# Patient Record
Sex: FEMALE | Race: WHITE | NOT HISPANIC OR LATINO | Employment: FULL TIME | URBAN - NONMETROPOLITAN AREA
[De-identification: names, ages, dates, MRNs, and addresses within clinical notes are randomized per-mention and may not be internally consistent; named-entity substitution may affect disease eponyms.]

---

## 2017-01-20 ENCOUNTER — TRANSCRIBE ORDERS (OUTPATIENT)
Dept: ADMINISTRATIVE | Facility: HOSPITAL | Age: 57
End: 2017-01-20

## 2017-01-20 ENCOUNTER — APPOINTMENT (OUTPATIENT)
Dept: LAB | Facility: HOSPITAL | Age: 57
End: 2017-01-20
Attending: FAMILY MEDICINE

## 2017-01-20 DIAGNOSIS — R53.83 LETHARGY: ICD-10-CM

## 2017-01-20 DIAGNOSIS — G25.81 RESTLESS LEGS: ICD-10-CM

## 2017-01-20 DIAGNOSIS — E78.5 HYPERLIPIDEMIA, UNSPECIFIED HYPERLIPIDEMIA TYPE: Primary | ICD-10-CM

## 2017-01-20 DIAGNOSIS — M54.9 BACKACHE, UNSPECIFIED: ICD-10-CM

## 2017-01-20 LAB
ALBUMIN SERPL-MCNC: 4.3 G/DL (ref 3.5–5)
ALBUMIN/GLOB SERPL: 1.4 G/DL (ref 1.1–2.5)
ALP SERPL-CCNC: 98 U/L (ref 24–120)
ALT SERPL W P-5'-P-CCNC: 39 U/L (ref 0–54)
ANION GAP SERPL CALCULATED.3IONS-SCNC: 12 MMOL/L (ref 4–13)
ARTICHOKE IGE QN: 195 MG/DL (ref 0–99)
AST SERPL-CCNC: 28 U/L (ref 7–45)
BILIRUB SERPL-MCNC: 0.5 MG/DL (ref 0.1–1)
BUN BLD-MCNC: 11 MG/DL (ref 5–21)
BUN/CREAT SERPL: 12.4 (ref 7–25)
CALCIUM SPEC-SCNC: 9.1 MG/DL (ref 8.4–10.4)
CHLORIDE SERPL-SCNC: 107 MMOL/L (ref 98–110)
CHOLEST SERPL-MCNC: 290 MG/DL (ref 130–200)
CO2 SERPL-SCNC: 24 MMOL/L (ref 24–31)
CREAT BLD-MCNC: 0.89 MG/DL (ref 0.5–1.4)
DEPRECATED RDW RBC AUTO: 46.3 FL (ref 40–54)
ERYTHROCYTE [DISTWIDTH] IN BLOOD BY AUTOMATED COUNT: 12.9 % (ref 12–15)
GFR SERPL CREATININE-BSD FRML MDRD: 66 ML/MIN/1.73
GLOBULIN UR ELPH-MCNC: 3.1 GM/DL
GLUCOSE BLD-MCNC: 96 MG/DL (ref 70–100)
HCT VFR BLD AUTO: 44.1 % (ref 37–47)
HDLC SERPL-MCNC: 37 MG/DL
HGB BLD-MCNC: 15.2 G/DL (ref 12–16)
LDLC/HDLC SERPL: 4.85 {RATIO}
MCH RBC QN AUTO: 33.8 PG (ref 28–32)
MCHC RBC AUTO-ENTMCNC: 34.5 G/DL (ref 33–36)
MCV RBC AUTO: 98 FL (ref 82–98)
PLATELET # BLD AUTO: 285 10*3/MM3 (ref 130–400)
PMV BLD AUTO: 9.6 FL (ref 6–12)
POTASSIUM BLD-SCNC: 4.3 MMOL/L (ref 3.5–5.3)
PROT SERPL-MCNC: 7.4 G/DL (ref 6.3–8.7)
RBC # BLD AUTO: 4.5 10*6/MM3 (ref 4.2–5.4)
SODIUM BLD-SCNC: 143 MMOL/L (ref 135–145)
T3FREE SERPL-MCNC: 3.51 PG/ML (ref 2.77–5.27)
T4 FREE SERPL-MCNC: 0.88 NG/DL (ref 0.78–2.19)
TRIGL SERPL-MCNC: 368 MG/DL (ref 0–149)
TSH SERPL DL<=0.05 MIU/L-ACNC: 0.86 MIU/ML (ref 0.47–4.68)
URATE SERPL-MCNC: 6.7 MG/DL (ref 2.7–7.5)
WBC NRBC COR # BLD: 7.22 10*3/MM3 (ref 4.8–10.8)

## 2017-01-20 PROCEDURE — 84443 ASSAY THYROID STIM HORMONE: CPT | Performed by: FAMILY MEDICINE

## 2017-01-20 PROCEDURE — 85027 COMPLETE CBC AUTOMATED: CPT | Performed by: FAMILY MEDICINE

## 2017-01-20 PROCEDURE — 84481 FREE ASSAY (FT-3): CPT | Performed by: FAMILY MEDICINE

## 2017-01-20 PROCEDURE — 80061 LIPID PANEL: CPT | Performed by: FAMILY MEDICINE

## 2017-01-20 PROCEDURE — 84550 ASSAY OF BLOOD/URIC ACID: CPT | Performed by: FAMILY MEDICINE

## 2017-01-20 PROCEDURE — 80053 COMPREHEN METABOLIC PANEL: CPT | Performed by: FAMILY MEDICINE

## 2017-01-20 PROCEDURE — 84439 ASSAY OF FREE THYROXINE: CPT | Performed by: FAMILY MEDICINE

## 2017-01-20 PROCEDURE — 36415 COLL VENOUS BLD VENIPUNCTURE: CPT | Performed by: FAMILY MEDICINE

## 2017-01-24 ENCOUNTER — HOSPITAL ENCOUNTER (OUTPATIENT)
Dept: GENERAL RADIOLOGY | Facility: HOSPITAL | Age: 57
Discharge: HOME | End: 2017-01-24
Attending: FAMILY MEDICINE | Admitting: FAMILY MEDICINE

## 2017-01-24 ENCOUNTER — TRANSCRIBE ORDERS (OUTPATIENT)
Dept: ADMINISTRATIVE | Facility: HOSPITAL | Age: 57
End: 2017-01-24

## 2017-01-24 DIAGNOSIS — M79.672 PAIN IN LEFT FOOT: ICD-10-CM

## 2017-01-24 DIAGNOSIS — M79.672 PAIN IN LEFT FOOT: Primary | ICD-10-CM

## 2017-01-24 PROCEDURE — 73630 X-RAY EXAM OF FOOT: CPT

## 2017-01-27 ENCOUNTER — TRANSCRIBE ORDERS (OUTPATIENT)
Dept: ADMINISTRATIVE | Facility: HOSPITAL | Age: 57
End: 2017-01-27

## 2017-01-27 DIAGNOSIS — Z12.31 ENCOUNTER FOR SCREENING MAMMOGRAM FOR MALIGNANT NEOPLASM OF BREAST: Primary | ICD-10-CM

## 2017-02-03 ENCOUNTER — APPOINTMENT (OUTPATIENT)
Dept: MAMMOGRAPHY | Facility: HOSPITAL | Age: 57
End: 2017-02-03
Attending: FAMILY MEDICINE

## 2017-02-28 PROCEDURE — 87086 URINE CULTURE/COLONY COUNT: CPT | Performed by: FAMILY MEDICINE

## 2017-06-27 ENCOUNTER — TELEPHONE (OUTPATIENT)
Dept: GASTROENTEROLOGY | Facility: CLINIC | Age: 57
End: 2017-06-27

## 2017-06-27 NOTE — TELEPHONE ENCOUNTER
ATTEMPTED TO CALL PT REGARDING CANCELED APPT ON 4/5/17.  CALLED NUMBER IN CHART ON 4/12/17, 5/12/17 AND 6/27/17, IT WAS DISCONNECTED ON ALL 3 ATTEMPTS.

## 2019-10-24 ENCOUNTER — APPOINTMENT (OUTPATIENT)
Dept: LAB | Facility: HOSPITAL | Age: 59
End: 2019-10-24

## 2019-10-24 ENCOUNTER — TRANSCRIBE ORDERS (OUTPATIENT)
Dept: ADMINISTRATIVE | Facility: HOSPITAL | Age: 59
End: 2019-10-24

## 2019-10-24 DIAGNOSIS — N63.11 UNSPECIFIED LUMP IN THE RIGHT BREAST, UPPER OUTER QUADRANT: ICD-10-CM

## 2019-10-24 DIAGNOSIS — Z00.01 ENCOUNTER FOR GENERAL ADULT MEDICAL EXAMINATION WITH ABNORMAL FINDINGS: Primary | ICD-10-CM

## 2019-10-24 DIAGNOSIS — E78.5 HYPERLIPIDEMIA, UNSPECIFIED HYPERLIPIDEMIA TYPE: ICD-10-CM

## 2019-10-24 LAB
ALBUMIN SERPL-MCNC: 4.3 G/DL (ref 3.5–5.2)
ALBUMIN/GLOB SERPL: 1.5 G/DL
ALP SERPL-CCNC: 79 U/L (ref 39–117)
ALT SERPL W P-5'-P-CCNC: 21 U/L (ref 1–33)
ANION GAP SERPL CALCULATED.3IONS-SCNC: 9.7 MMOL/L (ref 5–15)
AST SERPL-CCNC: 17 U/L (ref 1–32)
BILIRUB SERPL-MCNC: 0.3 MG/DL (ref 0.2–1.2)
BUN BLD-MCNC: 12 MG/DL (ref 6–20)
BUN/CREAT SERPL: 14.6 (ref 7–25)
CALCIUM SPEC-SCNC: 9.3 MG/DL (ref 8.6–10.5)
CHLORIDE SERPL-SCNC: 104 MMOL/L (ref 98–107)
CHOLEST SERPL-MCNC: 258 MG/DL (ref 0–200)
CO2 SERPL-SCNC: 28.3 MMOL/L (ref 22–29)
CREAT BLD-MCNC: 0.82 MG/DL (ref 0.57–1)
DEPRECATED RDW RBC AUTO: 45.2 FL (ref 37–54)
ERYTHROCYTE [DISTWIDTH] IN BLOOD BY AUTOMATED COUNT: 12.1 % (ref 12.3–15.4)
GFR SERPL CREATININE-BSD FRML MDRD: 72 ML/MIN/1.73
GLOBULIN UR ELPH-MCNC: 2.9 GM/DL
GLUCOSE BLD-MCNC: 101 MG/DL (ref 65–99)
HCT VFR BLD AUTO: 44.7 % (ref 34–46.6)
HDLC SERPL-MCNC: 40 MG/DL (ref 40–60)
HGB BLD-MCNC: 15.4 G/DL (ref 12–15.9)
LDLC SERPL CALC-MCNC: 173 MG/DL (ref 0–100)
LDLC/HDLC SERPL: 4.33 {RATIO}
MCH RBC QN AUTO: 34.7 PG (ref 26.6–33)
MCHC RBC AUTO-ENTMCNC: 34.5 G/DL (ref 31.5–35.7)
MCV RBC AUTO: 100.7 FL (ref 79–97)
PLATELET # BLD AUTO: 331 10*3/MM3 (ref 140–450)
PMV BLD AUTO: 9.3 FL (ref 6–12)
POTASSIUM BLD-SCNC: 4.5 MMOL/L (ref 3.5–5.2)
PROT SERPL-MCNC: 7.2 G/DL (ref 6–8.5)
RBC # BLD AUTO: 4.44 10*6/MM3 (ref 3.77–5.28)
SODIUM BLD-SCNC: 142 MMOL/L (ref 136–145)
T3FREE SERPL-MCNC: 3.16 PG/ML (ref 2–4.4)
T4 FREE SERPL-MCNC: 0.98 NG/DL (ref 0.93–1.7)
TRIGL SERPL-MCNC: 224 MG/DL (ref 0–150)
TSH SERPL DL<=0.05 MIU/L-ACNC: 1.31 UIU/ML (ref 0.27–4.2)
URATE SERPL-MCNC: 5.5 MG/DL (ref 2.4–5.7)
VLDLC SERPL-MCNC: 44.8 MG/DL (ref 5–40)
WBC NRBC COR # BLD: 7.41 10*3/MM3 (ref 3.4–10.8)

## 2019-10-24 PROCEDURE — 85027 COMPLETE CBC AUTOMATED: CPT | Performed by: PHYSICIAN ASSISTANT

## 2019-10-24 PROCEDURE — 84550 ASSAY OF BLOOD/URIC ACID: CPT | Performed by: PHYSICIAN ASSISTANT

## 2019-10-24 PROCEDURE — 84481 FREE ASSAY (FT-3): CPT | Performed by: PHYSICIAN ASSISTANT

## 2019-10-24 PROCEDURE — 84443 ASSAY THYROID STIM HORMONE: CPT | Performed by: PHYSICIAN ASSISTANT

## 2019-10-24 PROCEDURE — 80053 COMPREHEN METABOLIC PANEL: CPT | Performed by: PHYSICIAN ASSISTANT

## 2019-10-24 PROCEDURE — 84439 ASSAY OF FREE THYROXINE: CPT | Performed by: PHYSICIAN ASSISTANT

## 2019-10-24 PROCEDURE — 80061 LIPID PANEL: CPT | Performed by: PHYSICIAN ASSISTANT

## 2019-10-24 PROCEDURE — 36415 COLL VENOUS BLD VENIPUNCTURE: CPT | Performed by: PHYSICIAN ASSISTANT

## 2019-10-28 ENCOUNTER — OFFICE VISIT (OUTPATIENT)
Dept: GASTROENTEROLOGY | Facility: CLINIC | Age: 59
End: 2019-10-28

## 2019-10-28 VITALS
HEIGHT: 62 IN | DIASTOLIC BLOOD PRESSURE: 76 MMHG | SYSTOLIC BLOOD PRESSURE: 134 MMHG | TEMPERATURE: 97.5 F | BODY MASS INDEX: 25.4 KG/M2 | OXYGEN SATURATION: 98 % | HEART RATE: 80 BPM | WEIGHT: 138 LBS

## 2019-10-28 DIAGNOSIS — Z86.010 HX OF COLONIC POLYPS: Primary | ICD-10-CM

## 2019-10-28 PROBLEM — Z86.0100 HX OF COLONIC POLYPS: Status: ACTIVE | Noted: 2019-10-28

## 2019-10-28 PROCEDURE — S0285 CNSLT BEFORE SCREEN COLONOSC: HCPCS | Performed by: NURSE PRACTITIONER

## 2019-10-28 RX ORDER — IBUPROFEN 200 MG
200 TABLET ORAL AS NEEDED
COMMUNITY

## 2019-10-28 RX ORDER — SODIUM, POTASSIUM,MAG SULFATES 17.5-3.13G
1 SOLUTION, RECONSTITUTED, ORAL ORAL EVERY 12 HOURS
Qty: 2 BOTTLE | Refills: 0 | Status: ON HOLD | OUTPATIENT
Start: 2019-10-28 | End: 2019-11-06

## 2019-10-28 NOTE — PROGRESS NOTES
Chief Complaint   Patient presents with   • Colon Cancer Screening     Pt presents today for colon recall-last colon was 11/19/2012; Personal history of colon polyps     Subjective   HPI  Rachel Aranda is a 59 y.o. female who presents as a referral for preventative maintenance. She has no complaints of nausea or vomiting. No change in bowels. No wt loss. No BRBPR. No melena. There is no family hx for colon cancer. No abdominal pain. There was no Cologuard screening this year. The patients last colonoscopy was 11/19/12 with adenomatous polyps.  Past Medical History:   Diagnosis Date   • Back pain    • COPD, mild (CMS/HCC)    • History of colon polyps    • Hyperglycemia    • Hyperlipidemia    • PREM (obstructive sleep apnea)      Past Surgical History:   Procedure Laterality Date   • BREAST BIOPSY Right    • CARDIAC DEFIBRILLATOR PLACEMENT     • COLONOSCOPY  11/19/2012    Five 6mm-1.2cm tubular adenomatous polyps in transverse colon; Four less than 5mm hyperplastic polyps in the rectum; Repeat 4 years   • PARTIAL HYSTERECTOMY     • PATENT FORAMEN OVALE CLOSURE         Current Outpatient Medications:   •  ibuprofen (ADVIL,MOTRIN) 200 MG tablet, Take 200 mg by mouth As Needed for Mild Pain ., Disp: , Rfl:   •  sodium-potassium-magnesium sulfates (SUPREP BOWEL PREP KIT) 17.5-3.13-1.6 GM/177ML solution oral solution, Take 1 bottle by mouth Every 12 (Twelve) Hours. Split dose prep as directed by office instructions provided.  2 bottles = one kit., Disp: 2 bottle, Rfl: 0  No Known Allergies  Social History     Socioeconomic History   • Marital status: Single     Spouse name: Not on file   • Number of children: Not on file   • Years of education: Not on file   • Highest education level: Not on file   Tobacco Use   • Smoking status: Current Every Day Smoker     Packs/day: 1.00   • Smokeless tobacco: Never Used   Substance and Sexual Activity   • Alcohol use: Yes     Frequency: Never     Comment: Occasionally     Family History  "  Problem Relation Age of Onset   • Colon cancer Neg Hx    • Colon polyps Neg Hx    • Esophageal cancer Neg Hx    • Liver cancer Neg Hx    • Liver disease Neg Hx    • Rectal cancer Neg Hx    • Stomach cancer Neg Hx        REVIEW OF SYSTEMS  General: well appearing, no fever chills or sweats, no unexplained wt loss  HEENT: no acute visual or hearing disturbances  Cardiovascular: No chest pain or palpitations  Pulmonary: No shortness of breath, coughing, wheezing or hemoptysis  : No burning, urgency, hematuria, or dysuria  Musculoskeletal: No joint pain or stiffness  Peripheral: no edema  Skin: No lesions or rashes  Neuro: No dizziness, headaches, stroke, syncope  Endocrine: No hot or cold intolerances  Hematological: No blood dyscrasias    Objective   Vitals:    10/28/19 1323   BP: 134/76   BP Location: Left arm   Patient Position: Sitting   Cuff Size: Adult   Pulse: 80   Temp: 97.5 °F (36.4 °C)   TempSrc: Tympanic   SpO2: 98%   Weight: 62.6 kg (138 lb)   Height: 157.5 cm (62\")     Body mass index is 25.24 kg/m².    PHYSICAL EXAM  General: age appropriate well nourished well appearing, no acute distress  Head: normocephalic and atraumatic  Global assessment-supple  Neck-No JVD noted, no lymphadenopathy  Pulmonary-clear to auscultation bilaterally, normal respiratory effort  Cardiovascular-normal rate and rhythm, normal heart sounds, S1 and S2 noted  Abdomen-soft, non tender, non distended, normal bowel sounds all 4 quadrants, no hepatosplenomegaly noted  Extremities-No clubbing cyanosis or edema  Neuro-Non focal, converses appropriately, awake, alert, oriented    Imaging Results (most recent)     None        Assessment/Plan   Rachel was seen today for colon cancer screening.    Diagnoses and all orders for this visit:    Hx of colonic polyps  -     Case Request; Standing  -     Case Request    Other orders  -     Follow Anesthesia Guidelines / Standing Orders; Future  -     Obtain Informed Consent; Future  -     " Implement Anesthesia Orders Day of Procedure; Standing  -     Obtain Informed Consent; Standing  -     Verify bowel prep was successful; Standing  -     sodium-potassium-magnesium sulfates (SUPREP BOWEL PREP KIT) 17.5-3.13-1.6 GM/177ML solution oral solution; Take 1 bottle by mouth Every 12 (Twelve) Hours. Split dose prep as directed by office instructions provided.  2 bottles = one kit.      COLONOSCOPY WITH ANESTHESIA (N/A)       Body mass index is 25.24 kg/m². Patient's Body mass index is 25.24 kg/m². BMI is above normal parameters. Recommendations include: nutrition counseling.      All risks, benefits, alternatives, and indications of colonoscopy procedure have been discussed with the patient. Risks to include perforation of the colon requiring possible surgery or colostomy, risk of bleeding from biopsies or removal of colon tissue, possibility of missing a colon polyp or cancer, or adverse drug reaction.  Benefits to include the diagnosis and management of disease of the colon and rectum. Alternatives to include barium enema, radiographic evaluation, lab testing or no intervention. Pt verbalizes understanding and agrees.

## 2019-11-06 ENCOUNTER — ANESTHESIA EVENT (OUTPATIENT)
Dept: GASTROENTEROLOGY | Facility: HOSPITAL | Age: 59
End: 2019-11-06

## 2019-11-06 ENCOUNTER — TELEPHONE (OUTPATIENT)
Dept: GASTROENTEROLOGY | Facility: CLINIC | Age: 59
End: 2019-11-06

## 2019-11-06 ENCOUNTER — ANESTHESIA (OUTPATIENT)
Dept: GASTROENTEROLOGY | Facility: HOSPITAL | Age: 59
End: 2019-11-06

## 2019-11-06 ENCOUNTER — HOSPITAL ENCOUNTER (OUTPATIENT)
Facility: HOSPITAL | Age: 59
Setting detail: HOSPITAL OUTPATIENT SURGERY
Discharge: HOME | End: 2019-11-06
Attending: INTERNAL MEDICINE | Admitting: INTERNAL MEDICINE

## 2019-11-06 VITALS
DIASTOLIC BLOOD PRESSURE: 57 MMHG | RESPIRATION RATE: 21 BRPM | TEMPERATURE: 97 F | OXYGEN SATURATION: 96 % | WEIGHT: 140 LBS | BODY MASS INDEX: 25.76 KG/M2 | SYSTOLIC BLOOD PRESSURE: 115 MMHG | HEIGHT: 62 IN | HEART RATE: 96 BPM

## 2019-11-06 PROCEDURE — 25010000002 PROPOFOL 10 MG/ML EMULSION: Performed by: NURSE ANESTHETIST, CERTIFIED REGISTERED

## 2019-11-06 PROCEDURE — G0105 COLORECTAL SCRN; HI RISK IND: HCPCS | Performed by: INTERNAL MEDICINE

## 2019-11-06 RX ORDER — PROPOFOL 10 MG/ML
VIAL (ML) INTRAVENOUS AS NEEDED
Status: DISCONTINUED | OUTPATIENT
Start: 2019-11-06 | End: 2019-11-06 | Stop reason: SURG

## 2019-11-06 RX ORDER — SODIUM CHLORIDE 0.9 % (FLUSH) 0.9 %
10 SYRINGE (ML) INJECTION AS NEEDED
Status: DISCONTINUED | OUTPATIENT
Start: 2019-11-06 | End: 2019-11-06 | Stop reason: HOSPADM

## 2019-11-06 RX ORDER — SODIUM CHLORIDE 9 MG/ML
500 INJECTION, SOLUTION INTRAVENOUS CONTINUOUS PRN
Status: DISCONTINUED | OUTPATIENT
Start: 2019-11-06 | End: 2019-11-06 | Stop reason: HOSPADM

## 2019-11-06 RX ADMIN — LIDOCAINE HYDROCHLORIDE 5 MG: 20 INJECTION, SOLUTION INTRAVENOUS at 07:41

## 2019-11-06 RX ADMIN — SODIUM CHLORIDE 500 ML: 9 INJECTION, SOLUTION INTRAVENOUS at 07:18

## 2019-11-06 RX ADMIN — PROPOFOL 400 MG: 10 INJECTION, EMULSION INTRAVENOUS at 07:40

## 2019-11-06 NOTE — ANESTHESIA PREPROCEDURE EVALUATION
Anesthesia Evaluation     no history of anesthetic complications:  NPO Solid Status: > 8 hours  NPO Liquid Status: > 8 hours           Airway   Mallampati: I  TM distance: >3 FB  Neck ROM: full  No difficulty expected  Dental    (+) upper dentures    Pulmonary - normal exam   (+) a smoker Current Smoked day of surgery, COPD moderate, sleep apnea,   Cardiovascular - normal exam  Exercise tolerance: excellent (>7 METS)    (+) hyperlipidemia,       Neuro/Psych- negative ROS  GI/Hepatic/Renal/Endo - negative ROS     Musculoskeletal     (+) back pain,   Abdominal  - normal exam   Substance History   (+) alcohol use,      OB/GYN          Other - negative ROS                       Anesthesia Plan    ASA 2     MAC     intravenous induction     Anesthetic plan, all risks, benefits, and alternatives have been provided, discussed and informed consent has been obtained with: patient.

## 2019-11-06 NOTE — ANESTHESIA POSTPROCEDURE EVALUATION
Patient: Rachel Aranda    Procedure Summary     Date:  11/06/19 Room / Location:  [facility] ENDOSCOPY 2 / [facility] ENDOSCOPY    Anesthesia Start:  0739 Anesthesia Stop:  0801    Procedure:  COLONOSCOPY WITH ANESTHESIA (N/A ) Diagnosis:       Hx of colonic polyps      (Hx of colonic polyps [Z86.010])    Surgeon:  Lauren No MD Provider:  Jame Molina CRNA    Anesthesia Type:  MAC ASA Status:  2          Anesthesia Type: MAC  Last vitals  BP   148/74 (11/06/19 0655)   Temp   97 °F (36.1 °C) (11/06/19 0655)   Pulse   96 (11/06/19 0655)   Resp   20 (11/06/19 0655)     SpO2   97 % (11/06/19 0655)     Post Anesthesia Care and Evaluation    Patient location during evaluation: PACU  Patient participation: complete - patient participated  Level of consciousness: awake and awake and alert  Pain score: 0  Pain management: adequate  Airway patency: patent  Anesthetic complications: No anesthetic complications    Cardiovascular status: acceptable and stable  Respiratory status: acceptable and unassisted  Hydration status: acceptable

## 2019-11-07 ENCOUNTER — APPOINTMENT (OUTPATIENT)
Dept: LAB | Facility: HOSPITAL | Age: 59
End: 2019-11-07

## 2019-11-07 ENCOUNTER — TRANSCRIBE ORDERS (OUTPATIENT)
Dept: ADMINISTRATIVE | Facility: HOSPITAL | Age: 59
End: 2019-11-07

## 2019-11-07 DIAGNOSIS — R73.9 BLOOD GLUCOSE ELEVATED: Primary | ICD-10-CM

## 2019-11-07 LAB
FOLATE SERPL-MCNC: 13.9 NG/ML (ref 4.78–24.2)
HBA1C MFR BLD: 5.4 % (ref 4.8–5.6)
VIT B12 BLD-MCNC: <150 PG/ML (ref 211–946)

## 2019-11-07 PROCEDURE — 82746 ASSAY OF FOLIC ACID SERUM: CPT | Performed by: FAMILY MEDICINE

## 2019-11-07 PROCEDURE — 82607 VITAMIN B-12: CPT | Performed by: FAMILY MEDICINE

## 2019-11-07 PROCEDURE — 36415 COLL VENOUS BLD VENIPUNCTURE: CPT | Performed by: FAMILY MEDICINE

## 2019-11-07 PROCEDURE — 83036 HEMOGLOBIN GLYCOSYLATED A1C: CPT | Performed by: FAMILY MEDICINE

## 2020-01-24 ENCOUNTER — TRANSCRIBE ORDERS (OUTPATIENT)
Dept: ADMINISTRATIVE | Facility: HOSPITAL | Age: 60
End: 2020-01-24

## 2020-01-24 DIAGNOSIS — J44.9 CHRONIC OBSTRUCTIVE PULMONARY DISEASE, UNSPECIFIED COPD TYPE (HCC): ICD-10-CM

## 2020-01-24 DIAGNOSIS — E78.5 HYPERLIPIDEMIA, UNSPECIFIED HYPERLIPIDEMIA TYPE: Primary | ICD-10-CM

## 2020-01-24 DIAGNOSIS — R06.00 DYSPNEA, UNSPECIFIED TYPE: ICD-10-CM

## 2020-02-05 ENCOUNTER — HOSPITAL ENCOUNTER (OUTPATIENT)
Dept: WOMENS IMAGING | Age: 60
Discharge: HOME | End: 2020-02-05
Payer: COMMERCIAL

## 2020-02-05 PROCEDURE — 77080 DXA BONE DENSITY AXIAL: CPT

## 2020-02-05 PROCEDURE — 76641 ULTRASOUND BREAST COMPLETE: CPT

## 2020-02-05 PROCEDURE — G0279 TOMOSYNTHESIS, MAMMO: HCPCS

## 2020-02-07 ENCOUNTER — HOSPITAL ENCOUNTER (OUTPATIENT)
Dept: PULMONOLOGY | Facility: HOSPITAL | Age: 60
Discharge: HOME | End: 2020-02-07
Admitting: FAMILY MEDICINE

## 2020-02-07 DIAGNOSIS — J44.9 CHRONIC OBSTRUCTIVE PULMONARY DISEASE, UNSPECIFIED COPD TYPE (HCC): ICD-10-CM

## 2020-02-07 DIAGNOSIS — R06.00 DYSPNEA, UNSPECIFIED TYPE: ICD-10-CM

## 2020-02-07 DIAGNOSIS — E78.5 HYPERLIPIDEMIA, UNSPECIFIED HYPERLIPIDEMIA TYPE: ICD-10-CM

## 2020-02-07 LAB
ARTERIAL PATENCY WRIST A: POSITIVE
ATMOSPHERIC PRESS: 749 MMHG
BASE EXCESS BLDA CALC-SCNC: 0.7 MMOL/L (ref 0–2)
BDY SITE: ABNORMAL
BODY TEMPERATURE: 37 C
HCO3 BLDA-SCNC: 24.9 MMOL/L (ref 20–26)
Lab: ABNORMAL
MODALITY: ABNORMAL
PCO2 BLDA: 37.8 MM HG (ref 35–45)
PH BLDA: 7.43 PH UNITS (ref 7.35–7.45)
PO2 BLDA: 75.3 MM HG (ref 83–108)
SAO2 % BLDCOA: 96.6 % (ref 94–99)
VENTILATOR MODE: ABNORMAL

## 2020-02-07 PROCEDURE — 36600 WITHDRAWAL OF ARTERIAL BLOOD: CPT

## 2020-02-07 PROCEDURE — 94727 GAS DIL/WSHOT DETER LNG VOL: CPT

## 2020-02-07 PROCEDURE — 94729 DIFFUSING CAPACITY: CPT

## 2020-02-07 PROCEDURE — 94060 EVALUATION OF WHEEZING: CPT

## 2020-02-07 PROCEDURE — 82803 BLOOD GASES ANY COMBINATION: CPT

## 2020-02-07 PROCEDURE — 94060 EVALUATION OF WHEEZING: CPT | Performed by: INTERNAL MEDICINE

## 2020-02-07 PROCEDURE — 94727 GAS DIL/WSHOT DETER LNG VOL: CPT | Performed by: INTERNAL MEDICINE

## 2020-02-07 PROCEDURE — 94729 DIFFUSING CAPACITY: CPT | Performed by: INTERNAL MEDICINE

## 2020-02-07 RX ORDER — ALBUTEROL SULFATE 2.5 MG/3ML
2.5 SOLUTION RESPIRATORY (INHALATION) ONCE
Status: COMPLETED | OUTPATIENT
Start: 2020-02-07 | End: 2020-02-07

## 2020-02-07 RX ADMIN — ALBUTEROL SULFATE 2.5 MG: 2.5 SOLUTION RESPIRATORY (INHALATION) at 15:13

## 2020-02-11 ENCOUNTER — HOSPITAL ENCOUNTER (OUTPATIENT)
Dept: GENERAL RADIOLOGY | Facility: HOSPITAL | Age: 60
Discharge: HOME | End: 2020-02-11

## 2020-02-11 ENCOUNTER — HOSPITAL ENCOUNTER (OUTPATIENT)
Dept: CARDIOLOGY | Facility: HOSPITAL | Age: 60
Discharge: HOME | End: 2020-02-11
Admitting: PHYSICIAN ASSISTANT

## 2020-02-11 ENCOUNTER — TRANSCRIBE ORDERS (OUTPATIENT)
Dept: ADMINISTRATIVE | Facility: HOSPITAL | Age: 60
End: 2020-02-11

## 2020-02-11 DIAGNOSIS — R06.00 DYSPNEA, UNSPECIFIED TYPE: ICD-10-CM

## 2020-02-11 DIAGNOSIS — E78.5 HYPERLIPIDEMIA, UNSPECIFIED HYPERLIPIDEMIA TYPE: Primary | ICD-10-CM

## 2020-02-11 DIAGNOSIS — J44.9 OBSTRUCTIVE CHRONIC BRONCHITIS WITHOUT EXACERBATION (HCC): ICD-10-CM

## 2020-02-11 PROCEDURE — 93005 ELECTROCARDIOGRAM TRACING: CPT

## 2020-02-11 PROCEDURE — 93010 ELECTROCARDIOGRAM REPORT: CPT | Performed by: INTERNAL MEDICINE

## 2020-02-11 PROCEDURE — 71046 X-RAY EXAM CHEST 2 VIEWS: CPT

## 2020-04-23 ENCOUNTER — TRANSCRIBE ORDERS (OUTPATIENT)
Dept: ADMINISTRATIVE | Facility: HOSPITAL | Age: 60
End: 2020-04-23

## 2020-04-23 DIAGNOSIS — R14.0 ABDOMINAL DISTENSION (GASEOUS): Primary | ICD-10-CM

## 2020-05-06 ENCOUNTER — APPOINTMENT (OUTPATIENT)
Dept: ULTRASOUND IMAGING | Facility: HOSPITAL | Age: 60
End: 2020-05-06

## 2020-05-15 ENCOUNTER — HOSPITAL ENCOUNTER (OUTPATIENT)
Dept: ULTRASOUND IMAGING | Facility: HOSPITAL | Age: 60
Discharge: HOME | End: 2020-05-15
Admitting: FAMILY MEDICINE

## 2020-05-15 DIAGNOSIS — R14.0 ABDOMINAL DISTENSION (GASEOUS): ICD-10-CM

## 2020-05-15 PROCEDURE — 76830 TRANSVAGINAL US NON-OB: CPT

## 2022-11-07 ENCOUNTER — HOSPITAL ENCOUNTER (EMERGENCY)
Age: 62
Discharge: HOME | End: 2022-11-07
Attending: EMERGENCY MEDICINE

## 2022-11-07 ENCOUNTER — APPOINTMENT (OUTPATIENT)
Dept: GENERAL RADIOLOGY | Age: 62
End: 2022-11-07

## 2022-11-07 VITALS
DIASTOLIC BLOOD PRESSURE: 53 MMHG | WEIGHT: 130 LBS | HEART RATE: 93 BPM | SYSTOLIC BLOOD PRESSURE: 101 MMHG | OXYGEN SATURATION: 92 % | TEMPERATURE: 97.6 F | RESPIRATION RATE: 18 BRPM

## 2022-11-07 DIAGNOSIS — J44.1 COPD WITH ACUTE EXACERBATION (HCC): Primary | ICD-10-CM

## 2022-11-07 LAB
ALBUMIN SERPL-MCNC: 3.8 G/DL (ref 3.5–5.2)
ALP BLD-CCNC: 121 U/L (ref 35–104)
ALT SERPL-CCNC: 17 U/L (ref 5–33)
ANION GAP SERPL CALCULATED.3IONS-SCNC: 12 MMOL/L (ref 7–19)
AST SERPL-CCNC: 18 U/L (ref 5–32)
BASOPHILS ABSOLUTE: 0 K/UL (ref 0–0.2)
BASOPHILS RELATIVE PERCENT: 0.3 % (ref 0–1)
BILIRUB SERPL-MCNC: 0.7 MG/DL (ref 0.2–1.2)
BUN BLDV-MCNC: 13 MG/DL (ref 8–23)
CALCIUM SERPL-MCNC: 9.1 MG/DL (ref 8.8–10.2)
CHLORIDE BLD-SCNC: 97 MMOL/L (ref 98–111)
CO2: 24 MMOL/L (ref 22–29)
CREAT SERPL-MCNC: 0.8 MG/DL (ref 0.5–0.9)
EOSINOPHILS ABSOLUTE: 0 K/UL (ref 0–0.6)
EOSINOPHILS RELATIVE PERCENT: 0.1 % (ref 0–5)
GFR SERPL CREATININE-BSD FRML MDRD: >60 ML/MIN/{1.73_M2}
GLUCOSE BLD-MCNC: 116 MG/DL (ref 74–109)
HCT VFR BLD CALC: 49.4 % (ref 37–47)
HEMOGLOBIN: 16.4 G/DL (ref 12–16)
IMMATURE GRANULOCYTES #: 0.1 K/UL
LYMPHOCYTES ABSOLUTE: 2 K/UL (ref 1.1–4.5)
LYMPHOCYTES RELATIVE PERCENT: 12.7 % (ref 20–40)
MCH RBC QN AUTO: 33.1 PG (ref 27–31)
MCHC RBC AUTO-ENTMCNC: 33.2 G/DL (ref 33–37)
MCV RBC AUTO: 99.6 FL (ref 81–99)
MONOCYTES ABSOLUTE: 0.6 K/UL (ref 0–0.9)
MONOCYTES RELATIVE PERCENT: 3.6 % (ref 0–10)
NEUTROPHILS ABSOLUTE: 12.9 K/UL (ref 1.5–7.5)
NEUTROPHILS RELATIVE PERCENT: 83 % (ref 50–65)
PDW BLD-RTO: 12.3 % (ref 11.5–14.5)
PLATELET # BLD: 216 K/UL (ref 130–400)
PMV BLD AUTO: 8.4 FL (ref 9.4–12.3)
POTASSIUM REFLEX MAGNESIUM: 3.6 MMOL/L (ref 3.5–5)
RAPID INFLUENZA  B AGN: NEGATIVE
RAPID INFLUENZA A AGN: NEGATIVE
RBC # BLD: 4.96 M/UL (ref 4.2–5.4)
SARS-COV-2, NAAT: NOT DETECTED
SODIUM BLD-SCNC: 133 MMOL/L (ref 136–145)
TOTAL PROTEIN: 7.1 G/DL (ref 6.6–8.7)
WBC # BLD: 15.6 K/UL (ref 4.8–10.8)

## 2022-11-07 PROCEDURE — 87804 INFLUENZA ASSAY W/OPTIC: CPT

## 2022-11-07 PROCEDURE — 6360000002 HC RX W HCPCS: Performed by: EMERGENCY MEDICINE

## 2022-11-07 PROCEDURE — 36415 COLL VENOUS BLD VENIPUNCTURE: CPT

## 2022-11-07 PROCEDURE — 94640 AIRWAY INHALATION TREATMENT: CPT

## 2022-11-07 PROCEDURE — 71045 X-RAY EXAM CHEST 1 VIEW: CPT

## 2022-11-07 PROCEDURE — 99284 EMERGENCY DEPT VISIT MOD MDM: CPT

## 2022-11-07 PROCEDURE — 85025 COMPLETE CBC W/AUTO DIFF WBC: CPT

## 2022-11-07 PROCEDURE — 96374 THER/PROPH/DIAG INJ IV PUSH: CPT

## 2022-11-07 PROCEDURE — 6370000000 HC RX 637 (ALT 250 FOR IP): Performed by: EMERGENCY MEDICINE

## 2022-11-07 PROCEDURE — 71045 X-RAY EXAM CHEST 1 VIEW: CPT | Performed by: RADIOLOGY

## 2022-11-07 PROCEDURE — 80053 COMPREHEN METABOLIC PANEL: CPT

## 2022-11-07 PROCEDURE — 87635 SARS-COV-2 COVID-19 AMP PRB: CPT

## 2022-11-07 RX ORDER — METHYLPREDNISOLONE SODIUM SUCCINATE 125 MG/2ML
125 INJECTION, POWDER, LYOPHILIZED, FOR SOLUTION INTRAMUSCULAR; INTRAVENOUS ONCE
Status: COMPLETED | OUTPATIENT
Start: 2022-11-07 | End: 2022-11-07

## 2022-11-07 RX ORDER — PREDNISONE 20 MG/1
20 TABLET ORAL 2 TIMES DAILY
Qty: 10 TABLET | Refills: 0 | Status: SHIPPED | OUTPATIENT
Start: 2022-11-07 | End: 2022-11-12

## 2022-11-07 RX ORDER — IPRATROPIUM BROMIDE AND ALBUTEROL SULFATE 2.5; .5 MG/3ML; MG/3ML
1 SOLUTION RESPIRATORY (INHALATION) ONCE
Status: COMPLETED | OUTPATIENT
Start: 2022-11-07 | End: 2022-11-07

## 2022-11-07 RX ORDER — ALBUTEROL SULFATE 90 UG/1
2 AEROSOL, METERED RESPIRATORY (INHALATION) 4 TIMES DAILY PRN
Qty: 18 G | Refills: 0 | Status: SHIPPED | OUTPATIENT
Start: 2022-11-07

## 2022-11-07 RX ORDER — AZITHROMYCIN 250 MG/1
TABLET, FILM COATED ORAL
Qty: 1 PACKET | Refills: 0 | Status: SHIPPED | OUTPATIENT
Start: 2022-11-07 | End: 2022-11-11

## 2022-11-07 RX ADMIN — IPRATROPIUM BROMIDE AND ALBUTEROL SULFATE 1 AMPULE: 2.5; .5 SOLUTION RESPIRATORY (INHALATION) at 20:48

## 2022-11-07 RX ADMIN — METHYLPREDNISOLONE SODIUM SUCCINATE 125 MG: 125 INJECTION, POWDER, FOR SOLUTION INTRAMUSCULAR; INTRAVENOUS at 21:16

## 2022-11-07 ASSESSMENT — ENCOUNTER SYMPTOMS
COUGH: 1
VOMITING: 0
RHINORRHEA: 0
NAUSEA: 0
BACK PAIN: 0
SHORTNESS OF BREATH: 1
ABDOMINAL PAIN: 0
SORE THROAT: 0
DIARRHEA: 0

## 2022-11-08 NOTE — ED PROVIDER NOTES
[facility] EMERGENCY DEPT  eMERGENCY dEPARTMENT eNCOUnter      Pt Name: Marylen Sires  MRN: [id number]  [place] 1960  Date of evaluation: 11/7/2022  Provider: Benedicto Hewitt MD    [address] [place] [address]       Chief Complaint   Patient presents with    Nasal Congestion     With cough since yesterday         HISTORY OF PRESENT ILLNESS   (Location/Symptom, Timing/Onset,Context/Setting, Quality, Duration, Modifying Factors, Severity)  Note limiting factors. Marylen Sires is a 63 y.o. female who presents to the emergency department for cough with nasal congestion. Patient states that this increased since yesterday. Cough is occasionally productive nonbloody. She does have a known history of COPD and continues to smoke. Denies any chest pain. No leg swelling. She does not have an inhaler states last time she was at the doctor they did not give her a refill. Denies any fevers or chills. HPI    NursingNotes were reviewed. REVIEW OF SYSTEMS    (2-9 systems for level 4, 10 or more for level 5)     Review of Systems   Constitutional:  Positive for fatigue. Negative for chills and fever. HENT:  Positive for congestion. Negative for rhinorrhea and sore throat. Respiratory:  Positive for cough and shortness of breath. Cardiovascular:  Negative for chest pain and leg swelling. Gastrointestinal:  Negative for abdominal pain, diarrhea, nausea and vomiting. Genitourinary:  Negative for dysuria, frequency and urgency. Musculoskeletal:  Negative for back pain and neck pain. Neurological:  Negative for dizziness and headaches. All other systems reviewed and are negative. PAST MEDICALHISTORY   No past medical history on file. SURGICAL HISTORY     No past surgical history on file. CURRENT MEDICATIONS     Discharge Medication List as of 11/7/2022  9:29 PM          ALLERGIES     Patient has no known allergies. FAMILY HISTORY     No family history on file.        SOCIAL HISTORY       Social History Socioeconomic History    Marital status: Single       SCREENINGS    Margaret Coma Scale  Eye Opening: Spontaneous  Best Verbal Response: Oriented  Best Motor Response: Obeys commands  Margaret Coma Scale Score: 15        PHYSICAL EXAM    (up to 7 for level 4, 8 or more for level 5)     ED Triage Vitals [11/07/22 1714]   BP Temp Temp Source Heart Rate Resp SpO2 Height Weight   123/61 97.6 °F (36.4 °C) Tympanic (!) 116 17 93 % -- 130 lb (59 kg)       Physical Exam  Vitals and nursing note reviewed. Constitutional:       General: She is not in acute distress. Appearance: She is well-developed. HENT:      Head: Normocephalic and atraumatic. Right Ear: External ear normal.      Left Ear: External ear normal.      Nose: Nose normal.   Eyes:      Conjunctiva/sclera: Conjunctivae normal.   Neck:      Trachea: No tracheal deviation. Cardiovascular:      Rate and Rhythm: Normal rate and regular rhythm. Heart sounds: Normal heart sounds. No murmur heard. Pulmonary:      Effort: No tachypnea or respiratory distress. Breath sounds: Examination of the right-middle field reveals wheezing. Examination of the left-middle field reveals wheezing. Examination of the right-lower field reveals wheezing. Examination of the left-lower field reveals wheezing. Wheezing present. No rales. Abdominal:      Palpations: Abdomen is soft. Tenderness: There is no abdominal tenderness. Musculoskeletal:         General: Normal range of motion. Cervical back: Normal range of motion. Right lower leg: No edema. Left lower leg: No edema. Skin:     General: Skin is warm and dry. Neurological:      Mental Status: She is alert and oriented to person, place, and time. GCS: GCS eye subscore is 4. GCS verbal subscore is 5. GCS motor subscore is 6.        DIAGNOSTIC RESULTS         RADIOLOGY:  Non-plain film images such as CT, Ultrasound and MRI are read by the radiologist. Plain radiographic images are visualized and preliminarily interpreted bythe emergency physician with the below findings:      XR CHEST PORTABLE   Final Result   No pulmonary infiltrates or effusion. Mild changes of COPD. Recommendation: Follow up as clinically indicated. Electronically Signed by Aydee Alas MD at 07-Nov-2022 10:06:26 PM EST                       LABS:  Labs Reviewed   CBC WITH AUTO DIFFERENTIAL - Abnormal; Notable for the following components:       Result Value    WBC 15.6 (*)     Hemoglobin 16.4 (*)     Hematocrit 49.4 (*)     MCV 99.6 (*)     MCH 33.1 (*)     MPV 8.4 (*)     Neutrophils % 83.0 (*)     Lymphocytes % 12.7 (*)     Neutrophils Absolute 12.9 (*)     All other components within normal limits   COMPREHENSIVE METABOLIC PANEL W/ REFLEX TO MG FOR LOW K - Abnormal; Notable for the following components:    Sodium 133 (*)     Chloride 97 (*)     Glucose 116 (*)     Alkaline Phosphatase 121 (*)     All other components within normal limits   COVID-19, RAPID   RAPID INFLUENZA A/B ANTIGENS       All other labs were within normal range or not returned as of this dictation. EMERGENCY DEPARTMENT COURSE and DIFFERENTIAL DIAGNOSIS/MDM:   Vitals:    Vitals:    11/07/22 1714 11/07/22 2100   BP: 123/61 (!) 101/53   Pulse: (!) 116 93   Resp: 17 18   Temp: 97.6 °F (36.4 °C)    TempSrc: Tympanic    SpO2: 93% 92%   Weight: 130 lb (59 kg)        MDM     Amount and/or Complexity of Data Reviewed  Clinical lab tests: ordered and reviewed  Tests in the radiology section of CPT®: ordered and reviewed  Independent visualization of images, tracings, or specimens: yes      Likely URI causing copd exacerbation. Pt in no distress with some wheezing on exam 02 sat wnl. Neg covid and flu. Pt stable for outpt treatment. Understands return precautions.       CONSULTS:  None    PROCEDURES:  Unless otherwise noted below, none     Procedures    FINAL IMPRESSION      1. COPD with acute exacerbation (HCC)          DISPOSITION/PLAN DISPOSITION Decision To Discharge 11/07/2022 09:25:08 PM      PATIENT REFERRED TO:  Keagan Wallace DO  [address]   [phone]    Schedule an appointment as soon as possible for a visit in 1 week      [facility] EMERGENCY DEPT  Kyler Shefali  [phone]        DISCHARGE MEDICATIONS:  Discharge Medication List as of 11/7/2022  9:29 PM        START taking these medications    Details   predniSONE (DELTASONE) 20 MG tablet Take 1 tablet by mouth 2 times daily for 5 days, Disp-10 tablet, R-0Normal      azithromycin (ZITHROMAX Z-BRANDON) 250 MG tablet Take 2 tablets (500 mg) on Day 1, and then take 1 tablet (250 mg) on days 2 through 5., Disp-1 packet, R-0Normal      albuterol sulfate HFA (VENTOLIN HFA) 108 (90 Base) MCG/ACT inhaler Inhale 2 puffs into the lungs 4 times daily as needed for Wheezing, Disp-18 g, R-0Normal                (Please note that portions of this note were completed with a voice recognition program.  Efforts were made to edit thedictations but occasionally words are mis-transcribed.)    Lucien Watson MD (electronically signed)  Attending Emergency Physician        [place] Bolanos MD  11/08/22 9118

## 2024-02-14 ENCOUNTER — HOSPITAL ENCOUNTER (EMERGENCY)
Facility: HOSPITAL | Age: 64
Discharge: HOME | End: 2024-02-14
Admitting: EMERGENCY MEDICINE

## 2024-02-14 ENCOUNTER — APPOINTMENT (OUTPATIENT)
Dept: GENERAL RADIOLOGY | Facility: HOSPITAL | Age: 64
End: 2024-02-14

## 2024-02-14 VITALS
HEART RATE: 88 BPM | HEIGHT: 62 IN | DIASTOLIC BLOOD PRESSURE: 58 MMHG | RESPIRATION RATE: 18 BRPM | TEMPERATURE: 98.5 F | BODY MASS INDEX: 26.78 KG/M2 | SYSTOLIC BLOOD PRESSURE: 125 MMHG | OXYGEN SATURATION: 93 % | WEIGHT: 145.5 LBS

## 2024-02-14 DIAGNOSIS — R06.02 SHORTNESS OF BREATH: Primary | ICD-10-CM

## 2024-02-14 DIAGNOSIS — Z87.09 HISTORY OF COPD: ICD-10-CM

## 2024-02-14 DIAGNOSIS — R05.2 SUBACUTE COUGH: ICD-10-CM

## 2024-02-14 LAB
ALBUMIN SERPL-MCNC: 4 G/DL (ref 3.5–5.2)
ALBUMIN/GLOB SERPL: 1.3 G/DL
ALP SERPL-CCNC: 142 U/L (ref 39–117)
ALT SERPL W P-5'-P-CCNC: 26 U/L (ref 1–33)
ANION GAP SERPL CALCULATED.3IONS-SCNC: 12 MMOL/L (ref 5–15)
AST SERPL-CCNC: 29 U/L (ref 1–32)
BASOPHILS # BLD AUTO: 0.06 10*3/MM3 (ref 0–0.2)
BASOPHILS NFR BLD AUTO: 0.4 % (ref 0–1.5)
BILIRUB SERPL-MCNC: 0.3 MG/DL (ref 0–1.2)
BUN SERPL-MCNC: 7 MG/DL (ref 8–23)
BUN/CREAT SERPL: 11.1 (ref 7–25)
CALCIUM SPEC-SCNC: 9.4 MG/DL (ref 8.6–10.5)
CHLORIDE SERPL-SCNC: 99 MMOL/L (ref 98–107)
CO2 SERPL-SCNC: 27 MMOL/L (ref 22–29)
CREAT SERPL-MCNC: 0.63 MG/DL (ref 0.57–1)
D DIMER PPP FEU-MCNC: 0.45 MCGFEU/ML (ref 0–0.61)
DEPRECATED RDW RBC AUTO: 41 FL (ref 37–54)
EGFRCR SERPLBLD CKD-EPI 2021: 101.1 ML/MIN/1.73
EOSINOPHIL # BLD AUTO: 0.21 10*3/MM3 (ref 0–0.4)
EOSINOPHIL NFR BLD AUTO: 1.3 % (ref 0.3–6.2)
ERYTHROCYTE [DISTWIDTH] IN BLOOD BY AUTOMATED COUNT: 11.9 % (ref 12.3–15.4)
FLUAV RNA RESP QL NAA+PROBE: NOT DETECTED
FLUBV RNA RESP QL NAA+PROBE: NOT DETECTED
GLOBULIN UR ELPH-MCNC: 3.1 GM/DL
GLUCOSE SERPL-MCNC: 136 MG/DL (ref 65–99)
HCT VFR BLD AUTO: 38.5 % (ref 34–46.6)
HGB BLD-MCNC: 13.2 G/DL (ref 12–15.9)
HOLD SPECIMEN: NORMAL
HOLD SPECIMEN: NORMAL
IMM GRANULOCYTES # BLD AUTO: 0.07 10*3/MM3 (ref 0–0.05)
IMM GRANULOCYTES NFR BLD AUTO: 0.4 % (ref 0–0.5)
LYMPHOCYTES # BLD AUTO: 2.45 10*3/MM3 (ref 0.7–3.1)
LYMPHOCYTES NFR BLD AUTO: 15.6 % (ref 19.6–45.3)
MAGNESIUM SERPL-MCNC: 1.8 MG/DL (ref 1.6–2.4)
MCH RBC QN AUTO: 32.3 PG (ref 26.6–33)
MCHC RBC AUTO-ENTMCNC: 34.3 G/DL (ref 31.5–35.7)
MCV RBC AUTO: 94.1 FL (ref 79–97)
MONOCYTES # BLD AUTO: 0.7 10*3/MM3 (ref 0.1–0.9)
MONOCYTES NFR BLD AUTO: 4.5 % (ref 5–12)
NEUTROPHILS NFR BLD AUTO: 12.21 10*3/MM3 (ref 1.7–7)
NEUTROPHILS NFR BLD AUTO: 77.8 % (ref 42.7–76)
NRBC BLD AUTO-RTO: 0 /100 WBC (ref 0–0.2)
NT-PROBNP SERPL-MCNC: 214.3 PG/ML (ref 0–900)
PLATELET # BLD AUTO: 391 10*3/MM3 (ref 140–450)
PMV BLD AUTO: 8.6 FL (ref 6–12)
POTASSIUM SERPL-SCNC: 3.4 MMOL/L (ref 3.5–5.2)
PROT SERPL-MCNC: 7.1 G/DL (ref 6–8.5)
RBC # BLD AUTO: 4.09 10*6/MM3 (ref 3.77–5.28)
SARS-COV-2 RNA RESP QL NAA+PROBE: NOT DETECTED
SODIUM SERPL-SCNC: 138 MMOL/L (ref 136–145)
TROPONIN T SERPL HS-MCNC: 7 NG/L
WBC NRBC COR # BLD AUTO: 15.7 10*3/MM3 (ref 3.4–10.8)

## 2024-02-14 PROCEDURE — 83880 ASSAY OF NATRIURETIC PEPTIDE: CPT

## 2024-02-14 PROCEDURE — 93010 ELECTROCARDIOGRAM REPORT: CPT | Performed by: INTERNAL MEDICINE

## 2024-02-14 PROCEDURE — 83735 ASSAY OF MAGNESIUM: CPT

## 2024-02-14 PROCEDURE — 87636 SARSCOV2 & INF A&B AMP PRB: CPT

## 2024-02-14 PROCEDURE — 71045 X-RAY EXAM CHEST 1 VIEW: CPT

## 2024-02-14 PROCEDURE — 96374 THER/PROPH/DIAG INJ IV PUSH: CPT

## 2024-02-14 PROCEDURE — 99284 EMERGENCY DEPT VISIT MOD MDM: CPT

## 2024-02-14 PROCEDURE — 85025 COMPLETE CBC W/AUTO DIFF WBC: CPT

## 2024-02-14 PROCEDURE — 94799 UNLISTED PULMONARY SVC/PX: CPT

## 2024-02-14 PROCEDURE — 84484 ASSAY OF TROPONIN QUANT: CPT

## 2024-02-14 PROCEDURE — 80053 COMPREHEN METABOLIC PANEL: CPT

## 2024-02-14 PROCEDURE — 85379 FIBRIN DEGRADATION QUANT: CPT

## 2024-02-14 PROCEDURE — 93005 ELECTROCARDIOGRAM TRACING: CPT

## 2024-02-14 PROCEDURE — 25010000002 METHYLPREDNISOLONE PER 125 MG

## 2024-02-14 PROCEDURE — 94640 AIRWAY INHALATION TREATMENT: CPT

## 2024-02-14 RX ORDER — METHYLPREDNISOLONE 4 MG/1
TABLET ORAL
Qty: 21 TABLET | Refills: 0 | Status: SHIPPED | OUTPATIENT
Start: 2024-02-14 | End: 2024-02-14 | Stop reason: SDUPTHER

## 2024-02-14 RX ORDER — IPRATROPIUM BROMIDE AND ALBUTEROL SULFATE 2.5; .5 MG/3ML; MG/3ML
3 SOLUTION RESPIRATORY (INHALATION) ONCE
Status: COMPLETED | OUTPATIENT
Start: 2024-02-14 | End: 2024-02-14

## 2024-02-14 RX ORDER — SODIUM CHLORIDE 0.9 % (FLUSH) 0.9 %
10 SYRINGE (ML) INJECTION AS NEEDED
Status: DISCONTINUED | OUTPATIENT
Start: 2024-02-14 | End: 2024-02-14 | Stop reason: HOSPADM

## 2024-02-14 RX ORDER — METHYLPREDNISOLONE SODIUM SUCCINATE 125 MG/2ML
125 INJECTION, POWDER, LYOPHILIZED, FOR SOLUTION INTRAMUSCULAR; INTRAVENOUS ONCE
Status: COMPLETED | OUTPATIENT
Start: 2024-02-14 | End: 2024-02-14

## 2024-02-14 RX ORDER — ACETAMINOPHEN 500 MG
1000 TABLET ORAL ONCE
Status: COMPLETED | OUTPATIENT
Start: 2024-02-14 | End: 2024-02-14

## 2024-02-14 RX ORDER — METHYLPREDNISOLONE 4 MG/1
TABLET ORAL
Qty: 21 TABLET | Refills: 0 | Status: SHIPPED | OUTPATIENT
Start: 2024-02-14

## 2024-02-14 RX ADMIN — IPRATROPIUM BROMIDE AND ALBUTEROL SULFATE 3 ML: .5; 3 SOLUTION RESPIRATORY (INHALATION) at 17:42

## 2024-02-14 RX ADMIN — METHYLPREDNISOLONE SODIUM SUCCINATE 125 MG: 125 INJECTION, POWDER, FOR SOLUTION INTRAMUSCULAR; INTRAVENOUS at 17:41

## 2024-02-14 RX ADMIN — ACETAMINOPHEN 1000 MG: 500 TABLET ORAL at 19:18

## 2024-02-14 NOTE — ED PROVIDER NOTES
Subjective   History of Present Illness  Patient is a 63-year-old female who presents emergency department complaints of shortness of breath and cough.  Patient states that this has been going on for approximately 2 weeks now.  She states that she is coughing up yellow sputum.  Patient is also complaining of a headache that is in the front of her head and feels like this is from sinus pressure.  Patient denies chest pain.  Denies abdominal pain, nausea, vomiting, diarrhea.  States that she is having cold chills, however she is unsure if she has had any fevers.  Patient does have history of COPD.  She is a smoker, however she states that she has cut back due to her not feeling well.        Review of Systems   Constitutional:  Positive for chills.   HENT:  Positive for sinus pressure.    Respiratory:  Positive for cough and shortness of breath.    Neurological:  Positive for headaches.   All other systems reviewed and are negative.      Past Medical History:   Diagnosis Date    Back pain     COPD, mild     History of colon polyps     Hyperglycemia     Hyperlipidemia     PREM (obstructive sleep apnea)        No Known Allergies    Past Surgical History:   Procedure Laterality Date    BREAST BIOPSY Right     CARDIAC DEFIBRILLATOR PLACEMENT      COLONOSCOPY  11/19/2012    Five 6mm-1.2cm tubular adenomatous polyps in transverse colon; Four less than 5mm hyperplastic polyps in the rectum; Repeat 4 years    COLONOSCOPY N/A 11/6/2019    Procedure: COLONOSCOPY WITH ANESTHESIA;  Surgeon: Lauren No MD;  Location: [facility] ENDOSCOPY;  Service: Gastroenterology    PATENT FORAMEN OVALE CLOSURE      SUBTOTAL HYSTERECTOMY         Family History   Problem Relation Age of Onset    Colon cancer Neg Hx     Colon polyps Neg Hx     Esophageal cancer Neg Hx     Liver cancer Neg Hx     Liver disease Neg Hx     Rectal cancer Neg Hx     Stomach cancer Neg Hx        Social History     Socioeconomic History    Marital status: Single   Tobacco  Use    Smoking status: Every Day     Packs/day: 1.5     Types: Cigarettes    Smokeless tobacco: Never   Vaping Use    Vaping Use: Never used   Substance and Sexual Activity    Alcohol use: Yes     Comment: Occasionally    Drug use: No    Sexual activity: Defer           Objective   Physical Exam  Vitals and nursing note reviewed.   Constitutional:       General: She is not in acute distress.     Appearance: Normal appearance. She is normal weight. She is not ill-appearing or toxic-appearing.   HENT:      Head: Normocephalic.      Nose: Nose normal.   Cardiovascular:      Rate and Rhythm: Normal rate and regular rhythm.      Pulses: Normal pulses.      Heart sounds: Normal heart sounds.   Pulmonary:      Effort: Pulmonary effort is normal.      Breath sounds: Wheezing and rhonchi present.   Abdominal:      General: Abdomen is flat. Bowel sounds are normal. There is no distension.      Palpations: Abdomen is soft.      Tenderness: There is no abdominal tenderness.   Musculoskeletal:         General: Normal range of motion.      Cervical back: Normal range of motion and neck supple.   Skin:     General: Skin is warm and dry.   Neurological:      General: No focal deficit present.      Mental Status: She is alert and oriented to person, place, and time. Mental status is at baseline.   Psychiatric:         Mood and Affect: Mood normal.         Behavior: Behavior normal.         Thought Content: Thought content normal.         Judgment: Judgment normal.         Procedures           ED Course                                             Medical Decision Making  Rachel Aranda is a very pleasant 63 y.o. female who presents to the emergency department for shortness of breath and cough.     Patient was non-toxic and not-ill appearing on arrival. Vital signs stable.     Patient's presentation raises suspicion for differentials including, but not limited to, pneumonia, COPD exacerbation, viral illness.     External (non-ED)  record review: None    Given this, Rachel was placed on the monitor. Laboratory studies and imaging studies were ordered including CBC, CMP, troponin, BNP, D-dimer, magnesium, EKG, chest x-ray, COVID/flu swab.     Rachel was given DuoNeb and IV Solu-Medrol for symptomatic relief.    Imaging was reviewed by radiologist.  Chest x-ray revealed No acute appearing infiltrate or effusion. Stable mild bronchial wall thickening.    Decision rules/scores evaluated: Wells score for PE 1.5 points.  D-dimer unremarkable.  No report of hemoptysis.  No unilateral leg swelling.  Low suspicion for pulmonary embolism.    Labs were reviewed.  Leukocytosis on CBC, stable H&H.  Troponin, BNP, D-dimer unremarkable.  Magnesium unremarkable.  CMP with blood glucose of 136, potassium 3.4, otherwise unremarkable.  Negative for COVID and flu.  On re-evaluation, patient remained hemodynamically stable and appeared to be comfortable and in no acute distress.  Patient stated that she was feeling better.  Lung sounds had improved.  No longer any wheezing or rhonchi.  Steroid Dosepak was prescribed at discharge.    I discussed all of the lab and imaging results with the patient during this visit in the emergency department. I answered all the questions regarding the emergency department evaluation, diagnosis, and treatment plan. We talked about how crucial it is for the patient to follow up by calling their primary care provider and pulmonology as soon as possible to schedule an appointment for within the next few days or as soon as possible so that the symptoms can be reassessed to see if they have improved or to answer any additional questions. I also provided the patient with advice on returning safely and urged the patient to visit the emergency department right away if any worsening or new symptoms appeared. The patient verbalized understanding of the discharge instructions and agreed with them. Rachel was discharged in stable  condition.    Signed by:   YASHIRA Gamez 2/14/2024 19:31 CST     Dragon disclaimer:  Part of this note may be an electronic transcription/translation of spoken language to printed text using the Dragon Dictation System.    Problems Addressed:  History of COPD: complicated acute illness or injury  Shortness of breath: complicated acute illness or injury  Subacute cough: complicated acute illness or injury    Amount and/or Complexity of Data Reviewed  Labs: ordered.  Radiology: ordered.    Risk  OTC drugs.  Prescription drug management.        Final diagnoses:   Shortness of breath   Subacute cough   History of COPD       ED Disposition  ED Disposition       ED Disposition   Discharge    Condition   Stable    Comment   --               Jannet Osman, DO  [address]  [place] [address]  [phone]    Schedule an appointment as soon as possible for a visit in 1 day      [facility] EMERGENCY DEPARTMENT  [address] [place] [id number] [phone]  Go to   If symptoms worsen         Medication List        New Prescriptions      methylPREDNISolone 4 MG dose pack  Commonly known as: MEDROL  Take as directed on package instructions.               Where to Get Your Medications        These medications were sent to [id number] - [place], [place] - [address] - [phone]  - [phone] FX  [address], [place] [address]      Phone: [phone]   methylPREDNISolone 4 MG dose pack            Chantell Rock APRN  02/14/24 1934

## 2024-02-15 NOTE — DISCHARGE INSTRUCTIONS
Today you are seen in the ER for your symptoms.  Your chest x-ray was negative for pneumonia.  You were also negative for COVID and flu.  Steroid Dosepak was prescribed at discharge.  Please follow-up with primary care provider and pulmonologist as soon as possible to reassess symptoms.  Please return the ER for any new or worsening symptoms.

## 2024-02-18 LAB
QT INTERVAL: 344 MS
QTC INTERVAL: 469 MS

## 2024-04-04 ENCOUNTER — HOSPITAL ENCOUNTER (EMERGENCY)
Facility: HOSPITAL | Age: 64
Discharge: HOME | End: 2024-04-04
Attending: EMERGENCY MEDICINE | Admitting: EMERGENCY MEDICINE

## 2024-04-04 ENCOUNTER — APPOINTMENT (OUTPATIENT)
Dept: GENERAL RADIOLOGY | Facility: HOSPITAL | Age: 64
End: 2024-04-04

## 2024-04-04 VITALS
SYSTOLIC BLOOD PRESSURE: 144 MMHG | RESPIRATION RATE: 16 BRPM | TEMPERATURE: 97.8 F | DIASTOLIC BLOOD PRESSURE: 75 MMHG | HEIGHT: 62 IN | HEART RATE: 104 BPM | OXYGEN SATURATION: 91 % | WEIGHT: 135 LBS | BODY MASS INDEX: 24.84 KG/M2

## 2024-04-04 DIAGNOSIS — J44.1 COPD EXACERBATION: Primary | ICD-10-CM

## 2024-04-04 LAB
ALBUMIN SERPL-MCNC: 4.5 G/DL (ref 3.5–5.2)
ALBUMIN/GLOB SERPL: 1.7 G/DL
ALP SERPL-CCNC: 113 U/L (ref 39–117)
ALT SERPL W P-5'-P-CCNC: 17 U/L (ref 1–33)
ANION GAP SERPL CALCULATED.3IONS-SCNC: 11 MMOL/L (ref 5–15)
AST SERPL-CCNC: 19 U/L (ref 1–32)
BASOPHILS # BLD AUTO: 0.05 10*3/MM3 (ref 0–0.2)
BASOPHILS NFR BLD AUTO: 0.7 % (ref 0–1.5)
BILIRUB SERPL-MCNC: 0.3 MG/DL (ref 0–1.2)
BUN SERPL-MCNC: 11 MG/DL (ref 8–23)
BUN/CREAT SERPL: 16.7 (ref 7–25)
CALCIUM SPEC-SCNC: 9.1 MG/DL (ref 8.6–10.5)
CHLORIDE SERPL-SCNC: 102 MMOL/L (ref 98–107)
CO2 SERPL-SCNC: 27 MMOL/L (ref 22–29)
CREAT SERPL-MCNC: 0.66 MG/DL (ref 0.57–1)
D DIMER PPP FEU-MCNC: 0.41 MCGFEU/ML (ref 0–0.61)
DEPRECATED RDW RBC AUTO: 44.5 FL (ref 37–54)
EGFRCR SERPLBLD CKD-EPI 2021: 99.9 ML/MIN/1.73
EOSINOPHIL # BLD AUTO: 0.47 10*3/MM3 (ref 0–0.4)
EOSINOPHIL NFR BLD AUTO: 6.7 % (ref 0.3–6.2)
ERYTHROCYTE [DISTWIDTH] IN BLOOD BY AUTOMATED COUNT: 12.4 % (ref 12.3–15.4)
GLOBULIN UR ELPH-MCNC: 2.7 GM/DL
GLUCOSE SERPL-MCNC: 138 MG/DL (ref 65–99)
HCT VFR BLD AUTO: 43.9 % (ref 34–46.6)
HGB BLD-MCNC: 14.7 G/DL (ref 12–15.9)
IMM GRANULOCYTES # BLD AUTO: 0.01 10*3/MM3 (ref 0–0.05)
IMM GRANULOCYTES NFR BLD AUTO: 0.1 % (ref 0–0.5)
LYMPHOCYTES # BLD AUTO: 1.88 10*3/MM3 (ref 0.7–3.1)
LYMPHOCYTES NFR BLD AUTO: 26.9 % (ref 19.6–45.3)
MCH RBC QN AUTO: 32.3 PG (ref 26.6–33)
MCHC RBC AUTO-ENTMCNC: 33.5 G/DL (ref 31.5–35.7)
MCV RBC AUTO: 96.5 FL (ref 79–97)
MONOCYTES # BLD AUTO: 0.33 10*3/MM3 (ref 0.1–0.9)
MONOCYTES NFR BLD AUTO: 4.7 % (ref 5–12)
NEUTROPHILS NFR BLD AUTO: 4.26 10*3/MM3 (ref 1.7–7)
NEUTROPHILS NFR BLD AUTO: 60.9 % (ref 42.7–76)
NRBC BLD AUTO-RTO: 0 /100 WBC (ref 0–0.2)
NT-PROBNP SERPL-MCNC: 48.4 PG/ML (ref 0–900)
PLATELET # BLD AUTO: 301 10*3/MM3 (ref 140–450)
PMV BLD AUTO: 8.5 FL (ref 6–12)
POTASSIUM SERPL-SCNC: 4.1 MMOL/L (ref 3.5–5.2)
PROT SERPL-MCNC: 7.2 G/DL (ref 6–8.5)
RBC # BLD AUTO: 4.55 10*6/MM3 (ref 3.77–5.28)
SODIUM SERPL-SCNC: 140 MMOL/L (ref 136–145)
WBC NRBC COR # BLD AUTO: 7 10*3/MM3 (ref 3.4–10.8)

## 2024-04-04 PROCEDURE — 96374 THER/PROPH/DIAG INJ IV PUSH: CPT

## 2024-04-04 PROCEDURE — 99284 EMERGENCY DEPT VISIT MOD MDM: CPT

## 2024-04-04 PROCEDURE — 80053 COMPREHEN METABOLIC PANEL: CPT | Performed by: EMERGENCY MEDICINE

## 2024-04-04 PROCEDURE — 94640 AIRWAY INHALATION TREATMENT: CPT

## 2024-04-04 PROCEDURE — 93005 ELECTROCARDIOGRAM TRACING: CPT | Performed by: EMERGENCY MEDICINE

## 2024-04-04 PROCEDURE — 93010 ELECTROCARDIOGRAM REPORT: CPT | Performed by: EMERGENCY MEDICINE

## 2024-04-04 PROCEDURE — 85379 FIBRIN DEGRADATION QUANT: CPT | Performed by: EMERGENCY MEDICINE

## 2024-04-04 PROCEDURE — 25010000002 METHYLPREDNISOLONE PER 125 MG: Performed by: EMERGENCY MEDICINE

## 2024-04-04 PROCEDURE — 85025 COMPLETE CBC W/AUTO DIFF WBC: CPT | Performed by: EMERGENCY MEDICINE

## 2024-04-04 PROCEDURE — 71045 X-RAY EXAM CHEST 1 VIEW: CPT

## 2024-04-04 PROCEDURE — 83880 ASSAY OF NATRIURETIC PEPTIDE: CPT | Performed by: EMERGENCY MEDICINE

## 2024-04-04 PROCEDURE — 94799 UNLISTED PULMONARY SVC/PX: CPT

## 2024-04-04 PROCEDURE — 94645 CONT INHLJ TX EACH ADDL HOUR: CPT

## 2024-04-04 RX ORDER — ALBUTEROL SULFATE 2.5 MG/3ML
10 SOLUTION RESPIRATORY (INHALATION)
Status: COMPLETED | OUTPATIENT
Start: 2024-04-04 | End: 2024-04-04

## 2024-04-04 RX ORDER — PREDNISONE 20 MG/1
40 TABLET ORAL DAILY
Qty: 10 TABLET | Refills: 0 | Status: SHIPPED | OUTPATIENT
Start: 2024-04-04 | End: 2024-04-09

## 2024-04-04 RX ORDER — ALBUTEROL SULFATE 90 UG/1
2 AEROSOL, METERED RESPIRATORY (INHALATION) EVERY 4 HOURS PRN
Qty: 18 G | Refills: 0 | Status: SHIPPED | OUTPATIENT
Start: 2024-04-04

## 2024-04-04 RX ORDER — ALBUTEROL SULFATE 1.25 MG/3ML
1 SOLUTION RESPIRATORY (INHALATION) EVERY 6 HOURS PRN
Qty: 30 ML | Refills: 0 | Status: SHIPPED | OUTPATIENT
Start: 2024-04-04

## 2024-04-04 RX ORDER — SODIUM CHLORIDE 0.9 % (FLUSH) 0.9 %
10 SYRINGE (ML) INJECTION AS NEEDED
Status: DISCONTINUED | OUTPATIENT
Start: 2024-04-04 | End: 2024-04-04 | Stop reason: HOSPADM

## 2024-04-04 RX ORDER — METHYLPREDNISOLONE SODIUM SUCCINATE 125 MG/2ML
125 INJECTION, POWDER, LYOPHILIZED, FOR SOLUTION INTRAMUSCULAR; INTRAVENOUS ONCE
Status: COMPLETED | OUTPATIENT
Start: 2024-04-04 | End: 2024-04-04

## 2024-04-04 RX ADMIN — ALBUTEROL SULFATE 10 MG: 2.5 SOLUTION RESPIRATORY (INHALATION) at 12:23

## 2024-04-04 RX ADMIN — ALBUTEROL SULFATE 10 MG: 2.5 SOLUTION RESPIRATORY (INHALATION) at 10:27

## 2024-04-04 RX ADMIN — METHYLPREDNISOLONE SODIUM SUCCINATE 125 MG: 125 INJECTION, POWDER, FOR SOLUTION INTRAMUSCULAR; INTRAVENOUS at 10:16

## 2024-04-04 NOTE — ED PROVIDER NOTES
Subjective   History of Present Illness  Patient is a 63-year-old female with a history of COPD and sleep apnea who presents to the ER with shortness of air.  Patient states she felt fine when she woke up this morning.  Patient states she was working around some chemicals and bleach and developed the onset of shortness of air around 9 AM along with wheezing.  Patient does not wear home oxygen.  She denies any fever, chest pain, abdominal pain, nausea vomiting diarrhea, urinary changes, neurologic changes, cough, leg pain or swelling.      Review of Systems   Constitutional: Negative.    HENT: Negative.     Eyes: Negative.    Respiratory:  Positive for shortness of breath and wheezing.    Cardiovascular: Negative.    Gastrointestinal: Negative.    Endocrine: Negative.    Genitourinary: Negative.    Musculoskeletal: Negative.    Skin: Negative.    Allergic/Immunologic: Negative.    Neurological: Negative.    Hematological: Negative.    Psychiatric/Behavioral: Negative.     All other systems reviewed and are negative.      Past Medical History:   Diagnosis Date    Back pain     COPD, mild     History of colon polyps     Hyperglycemia     Hyperlipidemia     PREM (obstructive sleep apnea)        No Known Allergies    Past Surgical History:   Procedure Laterality Date    BREAST BIOPSY Right     CARDIAC DEFIBRILLATOR PLACEMENT      COLONOSCOPY  11/19/2012    Five 6mm-1.2cm tubular adenomatous polyps in transverse colon; Four less than 5mm hyperplastic polyps in the rectum; Repeat 4 years    COLONOSCOPY N/A 11/6/2019    Procedure: COLONOSCOPY WITH ANESTHESIA;  Surgeon: Lauren No MD;  Location: [facility] ENDOSCOPY;  Service: Gastroenterology    PATENT FORAMEN OVALE CLOSURE      SUBTOTAL HYSTERECTOMY         Family History   Problem Relation Age of Onset    Colon cancer Neg Hx     Colon polyps Neg Hx     Esophageal cancer Neg Hx     Liver cancer Neg Hx     Liver disease Neg Hx     Rectal cancer Neg Hx     Stomach cancer Neg  Hx        Social History     Socioeconomic History    Marital status: Single   Tobacco Use    Smoking status: Every Day     Current packs/day: 1.50     Types: Cigarettes    Smokeless tobacco: Never   Vaping Use    Vaping status: Never Used   Substance and Sexual Activity    Alcohol use: Yes     Comment: Occasionally    Drug use: No    Sexual activity: Defer           Objective   Physical Exam  Vitals and nursing note reviewed.   Constitutional:       Appearance: She is well-developed.   HENT:      Head: Normocephalic and atraumatic.   Eyes:      Conjunctiva/sclera: Conjunctivae normal.      Pupils: Pupils are equal, round, and reactive to light.   Cardiovascular:      Rate and Rhythm: Normal rate and regular rhythm.      Heart sounds: Normal heart sounds.   Pulmonary:      Effort: Pulmonary effort is normal. No tachypnea or respiratory distress.      Breath sounds: Wheezing present.   Abdominal:      Palpations: Abdomen is soft.      Tenderness: There is no abdominal tenderness.   Musculoskeletal:         General: No deformity. Normal range of motion.      Cervical back: Normal range of motion.   Skin:     General: Skin is warm.   Neurological:      Mental Status: She is alert and oriented to person, place, and time.   Psychiatric:         Behavior: Behavior normal.         Procedures           ED Course      EKG as interpreted by me: Normal sinus rhythm with a rate of 81, no acute ischemia or infarction                             Lab Results (last 24 hours)       Procedure Component Value Units Date/Time    CBC & Differential [[id number]]  (Abnormal) Collected: 04/04/24 1010    Specimen: Blood Updated: 04/04/24 1026    Narrative:      The following orders were created for panel order CBC & Differential.  Procedure                               Abnormality         Status                     ---------                               -----------         ------                     CBC Auto Differential[[id number]]         Abnormal            Final result                 Please view results for these tests on the individual orders.    Comprehensive Metabolic Panel [[id number]]  (Abnormal) Collected: 04/04/24 1010    Specimen: Blood Updated: 04/04/24 1058     Glucose 138 mg/dL      BUN 11 mg/dL      Creatinine 0.66 mg/dL      Sodium 140 mmol/L      Potassium 4.1 mmol/L      Comment: Slight hemolysis detected by analyzer. Result may be falsely elevated.        Chloride 102 mmol/L      CO2 27.0 mmol/L      Calcium 9.1 mg/dL      Total Protein 7.2 g/dL      Albumin 4.5 g/dL      ALT (SGPT) 17 U/L      AST (SGOT) 19 U/L      Alkaline Phosphatase 113 U/L      Total Bilirubin 0.3 mg/dL      Globulin 2.7 gm/dL      A/G Ratio 1.7 g/dL      BUN/Creatinine Ratio 16.7     Anion Gap 11.0 mmol/L      eGFR 99.9 mL/min/1.73     Narrative:      GFR Normal >60  Chronic Kidney Disease <60  Kidney Failure <15      BNP [[id number]]  (Normal) Collected: 04/04/24 1010    Specimen: Blood Updated: 04/04/24 1045     proBNP 48.4 pg/mL     Narrative:      This assay is used as an aid in the diagnosis of individuals suspected of having heart failure. It can be used as an aid in the diagnosis of acute decompensated heart failure (ADHF) in patients presenting with signs and symptoms of ADHF to the emergency department (ED). In addition, NT-proBNP of <300 pg/mL indicates ADHF is not likely.    Age Range Result Interpretation  NT-proBNP Concentration (pg/mL:      <50             Positive            >450                   Gray                 300-450                    Negative             <300    50-75           Positive            >900                  Gray                300-900                  Negative            <300      >75             Positive            >1800                  Gray                300-1800                  Negative            <300    D-dimer, Quantitative [[id number]]  (Normal) Collected: 04/04/24 1010    Specimen: Blood Updated: 04/04/24 1041     " D-Dimer, Quantitative 0.41 MCGFEU/mL     Narrative:      According to the assay 's published package insert, a normal (<0.50 MCGFEU/mL) D-dimer result in conjunction with a non-high clinical probability assessment, excludes deep vein thrombosis (DVT) and pulmonary embolism (PE) with high sensitivity.    D-dimer values increase with age and this can make VTE exclusion of an older population difficult. To address this, the [employer] College [employer], based on best available evidence and recent guidelines, recommends that clinicians use age-adjusted D-dimer thresholds in patients greater than 50 years of age with: a) a low probability of PE who do not meet all Pulmonary Embolism Rule Out Criteria, or b) in those with intermediate probability of PE.   The formula for an age-adjusted D-dimer cut-off is \"age/100\".  For example, a 60 year old patient would have an age-adjusted cut-off of 0.60 MCGFEU/mL and an 80 year old 0.80 MCGFEU/mL.    CBC Auto Differential [[id number]]  (Abnormal) Collected: 04/04/24 1010    Specimen: Blood Updated: 04/04/24 1026     WBC 7.00 10*3/mm3      RBC 4.55 10*6/mm3      Hemoglobin 14.7 g/dL      Hematocrit 43.9 %      MCV 96.5 fL      MCH 32.3 pg      MCHC 33.5 g/dL      RDW 12.4 %      RDW-SD 44.5 fl      MPV 8.5 fL      Platelets 301 10*3/mm3      Neutrophil % 60.9 %      Lymphocyte % 26.9 %      Monocyte % 4.7 %      Eosinophil % 6.7 %      Basophil % 0.7 %      Immature Grans % 0.1 %      Neutrophils, Absolute 4.26 10*3/mm3      Lymphocytes, Absolute 1.88 10*3/mm3      Monocytes, Absolute 0.33 10*3/mm3      Eosinophils, Absolute 0.47 10*3/mm3      Basophils, Absolute 0.05 10*3/mm3      Immature Grans, Absolute 0.01 10*3/mm3      nRBC 0.0 /100 WBC            XR Chest 1 View   Final Result   1. Interstitial lines in both lung bases peripherally may represent mild   edema. Interstitial pneumonitis is also considered.   2. Stable bronchial wall thickening. Old granulomatous " disease.               This report was signed and finalized on 4/4/2024 11:39 AM by Dr. Kentrell Doherty MD.                       Medical Decision Making  Patient is a 63-year-old female with a history of COPD and sleep apnea who presents to the ER with shortness of air.  Patient states she felt fine when she woke up this morning.  Patient states she was working around some chemicals and bleach and developed the onset of shortness of air around 9 AM along with wheezing.  Patient does not wear home oxygen.  She denies any fever, chest pain, abdominal pain, nausea vomiting diarrhea, urinary changes, neurologic changes, cough, leg pain or swelling.    Differential diagnosis: COPD exacerbation, allergic reaction, pneumonia, pulmonary embolus      Patient was given a continuous nebulizer treatment and Solu-Medrol.  Patient improved but was still having some wheezing.  An additional nebulizer treatment was given and symptoms resolved.  Labs were unremarkable.  Chest x-ray shows some interstitial lines in both lung bases peripherally that could be edema versus pneumonitis and some stable bronchial wall thickening.  Patient has no history of congestive heart failure.  She has no edema.  Her BNP is normal.  Patient was feeling significantly better and had sats in the mid 90s.  She was able to ambulate without any hypoxia.  Patient wanted to be discharged home.  Workup consistent with a COPD exacerbation.  Patient has had no infectious symptoms or fever.  She will be discharged home with prednisone, albuterol inhaler and albuterol nebulizer solution to follow-up with her PCP.  She is to return to the ER immediately for any shortness of breath, fever, pain or other concerns.  Patient agreeable and discharged home.    Problems Addressed:  COPD exacerbation: complicated acute illness or injury    Amount and/or Complexity of Data Reviewed  Labs: ordered. Decision-making details documented in ED Course.  Radiology: ordered.  Decision-making details documented in ED Course.  ECG/medicine tests: ordered. Decision-making details documented in ED Course.    Risk  Prescription drug management.        Final diagnoses:   COPD exacerbation       ED Disposition  ED Disposition       ED Disposition   Discharge    Condition   Stable    Comment   --               Jannet Osman,   [address]  [address]  [facility] [address]  [phone]    Schedule an appointment as soon as possible for a visit            Medication List        New Prescriptions      * albuterol sulfate  (90 Base) MCG/ACT inhaler  Commonly known as: PROVENTIL HFA;VENTOLIN HFA;PROAIR HFA  Inhale 2 puffs Every 4 (Four) Hours As Needed for Wheezing.     * albuterol 1.25 MG/3ML nebulizer solution  Commonly known as: ACCUNEB  Take 3 mL by nebulization Every 6 (Six) Hours As Needed for Shortness of Air.     predniSONE 20 MG tablet  Commonly known as: DELTASONE  Take 2 tablets by mouth Daily for 5 days.           * This list has 2 medication(s) that are the same as other medications prescribed for you. Read the directions carefully, and ask your doctor or other care provider to review them with you.                   Where to Get Your Medications        These medications were sent to [facility]/pharmacy #[id number] - [place], [place] - [address] AT [address]  [phone] [facility] [phone]   [address] [facility] [address]      Phone: [phone]   albuterol 1.25 MG/3ML nebulizer solution  albuterol sulfate  (90 Base) MCG/ACT inhaler  predniSONE 20 MG tablet            Pia Radford MD  04/04/24 5699

## 2024-04-05 LAB
QT INTERVAL: 388 MS
QTC INTERVAL: 450 MS

## 2025-02-09 ENCOUNTER — HOSPITAL ENCOUNTER (EMERGENCY)
Facility: HOSPITAL | Age: 65
Discharge: HOME | End: 2025-02-09
Attending: STUDENT IN AN ORGANIZED HEALTH CARE EDUCATION/TRAINING PROGRAM | Admitting: STUDENT IN AN ORGANIZED HEALTH CARE EDUCATION/TRAINING PROGRAM

## 2025-02-09 ENCOUNTER — APPOINTMENT (OUTPATIENT)
Dept: GENERAL RADIOLOGY | Facility: HOSPITAL | Age: 65
End: 2025-02-09

## 2025-02-09 VITALS
HEART RATE: 87 BPM | SYSTOLIC BLOOD PRESSURE: 99 MMHG | HEIGHT: 62 IN | TEMPERATURE: 98.8 F | OXYGEN SATURATION: 93 % | DIASTOLIC BLOOD PRESSURE: 79 MMHG | WEIGHT: 142.5 LBS | BODY MASS INDEX: 26.22 KG/M2 | RESPIRATION RATE: 18 BRPM

## 2025-02-09 DIAGNOSIS — J18.9 PNEUMONIA OF RIGHT LOWER LOBE DUE TO INFECTIOUS ORGANISM: Primary | ICD-10-CM

## 2025-02-09 PROCEDURE — 99283 EMERGENCY DEPT VISIT LOW MDM: CPT

## 2025-02-09 PROCEDURE — 93005 ELECTROCARDIOGRAM TRACING: CPT | Performed by: STUDENT IN AN ORGANIZED HEALTH CARE EDUCATION/TRAINING PROGRAM

## 2025-02-09 PROCEDURE — 71045 X-RAY EXAM CHEST 1 VIEW: CPT

## 2025-02-09 RX ORDER — AZITHROMYCIN 250 MG/1
500 TABLET, FILM COATED ORAL ONCE
Status: COMPLETED | OUTPATIENT
Start: 2025-02-09 | End: 2025-02-09

## 2025-02-09 RX ORDER — AZITHROMYCIN 500 MG/1
500 TABLET, FILM COATED ORAL DAILY
Qty: 5 TABLET | Refills: 0 | Status: SHIPPED | OUTPATIENT
Start: 2025-02-09 | End: 2025-02-14

## 2025-02-09 RX ADMIN — AZITHROMYCIN 500 MG: 250 TABLET, FILM COATED ORAL at 19:07

## 2025-02-10 LAB
QT INTERVAL: 350 MS
QTC INTERVAL: 442 MS

## 2025-02-10 NOTE — ED PROVIDER NOTES
Subjective   History of Present Illness  64-year-old female with past medical history of COPD presents emergency department flulike symptoms and productive cough.  Patient states that she has been having flulike symptoms for the past week, but began having a productive cough and was concerned and therefore came to the emergency department.  She states that she is generally felt unwell, but is unsure if she has been having fevers at home.  She denies any chills, nausea, vomiting, shortness of breath, chest pain.        Review of Systems   All other systems reviewed and are negative.      Past Medical History:   Diagnosis Date    Back pain     COPD, mild     History of colon polyps     Hyperglycemia     Hyperlipidemia     PREM (obstructive sleep apnea)        No Known Allergies    Past Surgical History:   Procedure Laterality Date    BREAST BIOPSY Right     CARDIAC DEFIBRILLATOR PLACEMENT      COLONOSCOPY  11/19/2012    Five 6mm-1.2cm tubular adenomatous polyps in transverse colon; Four less than 5mm hyperplastic polyps in the rectum; Repeat 4 years    COLONOSCOPY N/A 11/6/2019    Procedure: COLONOSCOPY WITH ANESTHESIA;  Surgeon: Lauren No MD;  Location: [facility] ENDOSCOPY;  Service: Gastroenterology    PATENT FORAMEN OVALE CLOSURE      SUBTOTAL HYSTERECTOMY         Family History   Problem Relation Age of Onset    Colon cancer Neg Hx     Colon polyps Neg Hx     Esophageal cancer Neg Hx     Liver cancer Neg Hx     Liver disease Neg Hx     Rectal cancer Neg Hx     Stomach cancer Neg Hx        Social History     Socioeconomic History    Marital status: Single   Tobacco Use    Smoking status: Every Day     Current packs/day: 1.50     Types: Cigarettes    Smokeless tobacco: Never   Vaping Use    Vaping status: Never Used   Substance and Sexual Activity    Alcohol use: Yes     Comment: Occasionally    Drug use: No    Sexual activity: Defer           Objective   Physical Exam  Constitutional:       Appearance: Normal  appearance.   HENT:      Head: Normocephalic.      Mouth/Throat:      Mouth: Mucous membranes are moist.   Eyes:      Pupils: Pupils are equal, round, and reactive to light.   Cardiovascular:      Rate and Rhythm: Normal rate and regular rhythm.   Pulmonary:      Effort: Pulmonary effort is normal.   Abdominal:      Palpations: Abdomen is soft.   Musculoskeletal:         General: Normal range of motion.      Cervical back: Normal range of motion.   Neurological:      General: No focal deficit present.      Mental Status: She is alert.   Psychiatric:         Mood and Affect: Mood normal.         Procedures           ED Course  ED Course as of 02/09/25 1812   Sun Feb 09, 2025   1743 EKG independently interpreted by me  Rate 96  KS interval 144  QTc 442  Normal sinus rhythm  Q waves in the anterior leads  No STEMI    Electronically signed by Hemant Hung DO, 02/09/25, 5:44 PM CST.   [OI]      ED Course User Index  [OI] Hemant Hung DO                                                       Medical Decision Making  64-year-old female with past medical history of COPD presents emergency department flulike symptoms and productive cough.  Patient states that she has been having flulike symptoms for the past week, but began having a productive cough and was concerned and therefore came to the emergency department.  She states that she is generally felt unwell, but is unsure if she has been having fevers at home.  She denies any chills, nausea, vomiting, shortness of breath, chest pain.  Vital signs within normal limits upon arrival.  History and physical was consistent with pneumonia therefore chest x-ray was performed.  Chest x-ray independently interpreted by me shows a right lower lobe infiltrate consistent with pneumonia.  Patient given azithromycin here in the emergency department, and a prescription for home use.  She is advised to continue symptomatic control with Tylenol, Motrin, and fluids.  She is also advised to  return to the emergency department for any worsening symptoms.  Patient understands and agrees with our plan for discharge.    Electronically signed by Hemant Hung DO, 02/09/25, 6:12 PM CST.      Problems Addressed:  Pneumonia of right lower lobe due to infectious organism: complicated acute illness or injury    Amount and/or Complexity of Data Reviewed  Radiology: ordered.  ECG/medicine tests: ordered.    Risk  Prescription drug management.        Final diagnoses:   Pneumonia of right lower lobe due to infectious organism       ED Disposition  ED Disposition       ED Disposition   Discharge    Condition   Stable    Comment   --               Jannet Osman,   [address]  [address]  [facility] [address] [phone]    In 1 week           Medication List        New Prescriptions      azithromycin 500 MG tablet  Commonly known as: ZITHROMAX  Take 1 tablet by mouth Daily for 5 days.               Where to Get Your Medications        These medications were sent to [id number] - [place], [place] - [address] [facility] [address] - [phone]  - [phone] FX  [address], [facility] [address]      Phone: [phone]   azithromycin 500 MG tablet            Hemant Hung DO  02/09/25 0400

## 2025-02-12 ENCOUNTER — APPOINTMENT (OUTPATIENT)
Dept: GENERAL RADIOLOGY | Facility: HOSPITAL | Age: 65
End: 2025-02-12

## 2025-02-12 ENCOUNTER — APPOINTMENT (OUTPATIENT)
Dept: CT IMAGING | Facility: HOSPITAL | Age: 65
End: 2025-02-12

## 2025-02-12 ENCOUNTER — HOSPITAL ENCOUNTER (EMERGENCY)
Facility: HOSPITAL | Age: 65
Discharge: HOME | End: 2025-02-12
Admitting: EMERGENCY MEDICINE

## 2025-02-12 VITALS
DIASTOLIC BLOOD PRESSURE: 51 MMHG | SYSTOLIC BLOOD PRESSURE: 125 MMHG | WEIGHT: 140 LBS | BODY MASS INDEX: 25.76 KG/M2 | HEART RATE: 93 BPM | RESPIRATION RATE: 20 BRPM | HEIGHT: 62 IN | TEMPERATURE: 99.1 F | OXYGEN SATURATION: 91 %

## 2025-02-12 DIAGNOSIS — J44.9 CHRONIC OBSTRUCTIVE PULMONARY DISEASE, UNSPECIFIED COPD TYPE: ICD-10-CM

## 2025-02-12 DIAGNOSIS — J18.9 PNEUMONIA OF RIGHT LOWER LOBE DUE TO INFECTIOUS ORGANISM: Primary | ICD-10-CM

## 2025-02-12 LAB
ALBUMIN SERPL-MCNC: 4 G/DL (ref 3.5–5.2)
ALBUMIN/GLOB SERPL: 1.2 G/DL
ALP SERPL-CCNC: 168 U/L (ref 39–117)
ALT SERPL W P-5'-P-CCNC: 30 U/L (ref 1–33)
ANION GAP SERPL CALCULATED.3IONS-SCNC: 13 MMOL/L (ref 5–15)
AST SERPL-CCNC: 21 U/L (ref 1–32)
B PARAPERT DNA SPEC QL NAA+PROBE: NOT DETECTED
B PERT DNA SPEC QL NAA+PROBE: NOT DETECTED
BASOPHILS # BLD AUTO: 0.04 10*3/MM3 (ref 0–0.2)
BASOPHILS NFR BLD AUTO: 0.3 % (ref 0–1.5)
BILIRUB SERPL-MCNC: 0.4 MG/DL (ref 0–1.2)
BUN SERPL-MCNC: 7 MG/DL (ref 8–23)
BUN/CREAT SERPL: 11.3 (ref 7–25)
C PNEUM DNA NPH QL NAA+NON-PROBE: NOT DETECTED
CALCIUM SPEC-SCNC: 9.4 MG/DL (ref 8.6–10.5)
CHLORIDE SERPL-SCNC: 98 MMOL/L (ref 98–107)
CO2 SERPL-SCNC: 23 MMOL/L (ref 22–29)
CREAT SERPL-MCNC: 0.62 MG/DL (ref 0.57–1)
D DIMER PPP FEU-MCNC: 0.64 MCGFEU/ML (ref 0–0.62)
D-LACTATE SERPL-SCNC: 3.5 MMOL/L (ref 0.5–2)
DEPRECATED RDW RBC AUTO: 39.8 FL (ref 37–54)
EGFRCR SERPLBLD CKD-EPI 2021: 100.8 ML/MIN/1.73
EOSINOPHIL # BLD AUTO: 0.18 10*3/MM3 (ref 0–0.4)
EOSINOPHIL NFR BLD AUTO: 1.3 % (ref 0.3–6.2)
ERYTHROCYTE [DISTWIDTH] IN BLOOD BY AUTOMATED COUNT: 11.8 % (ref 12.3–15.4)
FLUAV SUBTYP SPEC NAA+PROBE: NOT DETECTED
FLUBV RNA ISLT QL NAA+PROBE: NOT DETECTED
GLOBULIN UR ELPH-MCNC: 3.3 GM/DL
GLUCOSE SERPL-MCNC: 166 MG/DL (ref 65–99)
HADV DNA SPEC NAA+PROBE: NOT DETECTED
HCOV 229E RNA SPEC QL NAA+PROBE: NOT DETECTED
HCOV HKU1 RNA SPEC QL NAA+PROBE: NOT DETECTED
HCOV NL63 RNA SPEC QL NAA+PROBE: NOT DETECTED
HCOV OC43 RNA SPEC QL NAA+PROBE: NOT DETECTED
HCT VFR BLD AUTO: 38.9 % (ref 34–46.6)
HGB BLD-MCNC: 13.7 G/DL (ref 12–15.9)
HMPV RNA NPH QL NAA+NON-PROBE: NOT DETECTED
HOLD SPECIMEN: NORMAL
HOLD SPECIMEN: NORMAL
HPIV1 RNA ISLT QL NAA+PROBE: NOT DETECTED
HPIV2 RNA SPEC QL NAA+PROBE: NOT DETECTED
HPIV3 RNA NPH QL NAA+PROBE: NOT DETECTED
HPIV4 P GENE NPH QL NAA+PROBE: NOT DETECTED
IMM GRANULOCYTES # BLD AUTO: 0.04 10*3/MM3 (ref 0–0.05)
IMM GRANULOCYTES NFR BLD AUTO: 0.3 % (ref 0–0.5)
LYMPHOCYTES # BLD AUTO: 1.96 10*3/MM3 (ref 0.7–3.1)
LYMPHOCYTES NFR BLD AUTO: 13.8 % (ref 19.6–45.3)
M PNEUMO IGG SER IA-ACNC: NOT DETECTED
MCH RBC QN AUTO: 32.7 PG (ref 26.6–33)
MCHC RBC AUTO-ENTMCNC: 35.2 G/DL (ref 31.5–35.7)
MCV RBC AUTO: 92.8 FL (ref 79–97)
MONOCYTES # BLD AUTO: 0.76 10*3/MM3 (ref 0.1–0.9)
MONOCYTES NFR BLD AUTO: 5.4 % (ref 5–12)
NEUTROPHILS NFR BLD AUTO: 11.18 10*3/MM3 (ref 1.7–7)
NEUTROPHILS NFR BLD AUTO: 78.9 % (ref 42.7–76)
NRBC BLD AUTO-RTO: 0 /100 WBC (ref 0–0.2)
PLATELET # BLD AUTO: 380 10*3/MM3 (ref 140–450)
PMV BLD AUTO: 8.5 FL (ref 6–12)
POTASSIUM SERPL-SCNC: 3.5 MMOL/L (ref 3.5–5.2)
PROT SERPL-MCNC: 7.3 G/DL (ref 6–8.5)
RBC # BLD AUTO: 4.19 10*6/MM3 (ref 3.77–5.28)
RHINOVIRUS RNA SPEC NAA+PROBE: NOT DETECTED
RSV RNA NPH QL NAA+NON-PROBE: NOT DETECTED
SARS-COV-2 RNA RESP QL NAA+PROBE: NOT DETECTED
SODIUM SERPL-SCNC: 134 MMOL/L (ref 136–145)
WBC NRBC COR # BLD AUTO: 14.16 10*3/MM3 (ref 3.4–10.8)
WHOLE BLOOD HOLD COAG: NORMAL
WHOLE BLOOD HOLD SPECIMEN: NORMAL

## 2025-02-12 PROCEDURE — 96375 TX/PRO/DX INJ NEW DRUG ADDON: CPT

## 2025-02-12 PROCEDURE — 94799 UNLISTED PULMONARY SVC/PX: CPT

## 2025-02-12 PROCEDURE — 71275 CT ANGIOGRAPHY CHEST: CPT

## 2025-02-12 PROCEDURE — 71045 X-RAY EXAM CHEST 1 VIEW: CPT

## 2025-02-12 PROCEDURE — 85025 COMPLETE CBC W/AUTO DIFF WBC: CPT

## 2025-02-12 PROCEDURE — 83605 ASSAY OF LACTIC ACID: CPT

## 2025-02-12 PROCEDURE — 96365 THER/PROPH/DIAG IV INF INIT: CPT

## 2025-02-12 PROCEDURE — 85379 FIBRIN DEGRADATION QUANT: CPT

## 2025-02-12 PROCEDURE — 94761 N-INVAS EAR/PLS OXIMETRY MLT: CPT

## 2025-02-12 PROCEDURE — 99285 EMERGENCY DEPT VISIT HI MDM: CPT

## 2025-02-12 PROCEDURE — 0202U NFCT DS 22 TRGT SARS-COV-2: CPT

## 2025-02-12 PROCEDURE — 36415 COLL VENOUS BLD VENIPUNCTURE: CPT

## 2025-02-12 PROCEDURE — 25510000001 IOPAMIDOL PER 1 ML

## 2025-02-12 PROCEDURE — 94664 DEMO&/EVAL PT USE INHALER: CPT

## 2025-02-12 PROCEDURE — 25010000002 METHYLPREDNISOLONE PER 125 MG

## 2025-02-12 PROCEDURE — 94640 AIRWAY INHALATION TREATMENT: CPT

## 2025-02-12 PROCEDURE — 87040 BLOOD CULTURE FOR BACTERIA: CPT

## 2025-02-12 PROCEDURE — 25010000002 CEFTRIAXONE PER 250 MG

## 2025-02-12 PROCEDURE — 80053 COMPREHEN METABOLIC PANEL: CPT

## 2025-02-12 PROCEDURE — 25810000003 SODIUM CHLORIDE 0.9 % SOLUTION

## 2025-02-12 RX ORDER — ALBUTEROL SULFATE 1.25 MG/3ML
1 SOLUTION RESPIRATORY (INHALATION) EVERY 6 HOURS PRN
Qty: 30 ML | Refills: 0 | Status: SHIPPED | OUTPATIENT
Start: 2025-02-12

## 2025-02-12 RX ORDER — METHYLPREDNISOLONE SODIUM SUCCINATE 125 MG/2ML
125 INJECTION, POWDER, LYOPHILIZED, FOR SOLUTION INTRAMUSCULAR; INTRAVENOUS ONCE
Status: COMPLETED | OUTPATIENT
Start: 2025-02-12 | End: 2025-02-12

## 2025-02-12 RX ORDER — IOPAMIDOL 755 MG/ML
100 INJECTION, SOLUTION INTRAVASCULAR
Status: COMPLETED | OUTPATIENT
Start: 2025-02-12 | End: 2025-02-12

## 2025-02-12 RX ORDER — IPRATROPIUM BROMIDE AND ALBUTEROL SULFATE 2.5; .5 MG/3ML; MG/3ML
3 SOLUTION RESPIRATORY (INHALATION) ONCE
Status: COMPLETED | OUTPATIENT
Start: 2025-02-12 | End: 2025-02-12

## 2025-02-12 RX ORDER — CEFDINIR 300 MG/1
300 CAPSULE ORAL 2 TIMES DAILY
Qty: 20 CAPSULE | Refills: 0 | Status: SHIPPED | OUTPATIENT
Start: 2025-02-12 | End: 2025-02-22

## 2025-02-12 RX ORDER — METHYLPREDNISOLONE 4 MG/1
TABLET ORAL
Qty: 21 TABLET | Refills: 0 | Status: SHIPPED | OUTPATIENT
Start: 2025-02-12

## 2025-02-12 RX ORDER — SODIUM CHLORIDE 0.9 % (FLUSH) 0.9 %
10 SYRINGE (ML) INJECTION AS NEEDED
Status: DISCONTINUED | OUTPATIENT
Start: 2025-02-12 | End: 2025-02-12 | Stop reason: HOSPADM

## 2025-02-12 RX ORDER — ACETAMINOPHEN 500 MG
1000 TABLET ORAL ONCE
Status: COMPLETED | OUTPATIENT
Start: 2025-02-12 | End: 2025-02-12

## 2025-02-12 RX ADMIN — ACETAMINOPHEN 1000 MG: 500 TABLET, FILM COATED ORAL at 20:28

## 2025-02-12 RX ADMIN — IPRATROPIUM BROMIDE AND ALBUTEROL SULFATE 3 ML: .5; 3 SOLUTION RESPIRATORY (INHALATION) at 18:39

## 2025-02-12 RX ADMIN — SODIUM CHLORIDE 500 ML: 9 INJECTION, SOLUTION INTRAVENOUS at 19:10

## 2025-02-12 RX ADMIN — IOPAMIDOL 100 ML: 755 INJECTION, SOLUTION INTRAVENOUS at 19:04

## 2025-02-12 RX ADMIN — SODIUM CHLORIDE 1000 MG: 900 INJECTION INTRAVENOUS at 20:28

## 2025-02-12 RX ADMIN — METHYLPREDNISOLONE SODIUM SUCCINATE 125 MG: 125 INJECTION, POWDER, FOR SOLUTION INTRAMUSCULAR; INTRAVENOUS at 17:01

## 2025-02-12 NOTE — ED NOTES
MEDICAL SCREENING:    Reason for Visit: Patient is a 64-year-old female who presents to the emergency department with complaint of productive cough, shortness of breath, headache, pain in the middle of her back that radiates to her bilateral ribs/abdomen. She was here 2/9/25, was dx with pneumonia.  She states that she was prescribed azithromycin and has been taking it as prescribed.  States that her symptoms have worsened.  Feels like her pneumonia is in both lungs.    Denies nausea, vomiting, diarrhea, fever, chills, and any other symptoms.  Patient is not ill-appearing, nontoxic-appearing, and is in no acute distress.     Patient initially seen in triage.  The patient was advised further evaluation and diagnostic testing will be needed, some of the treatment and testing will be initiated in the lobby in order to begin the process.  The patient will be returned to the waiting area for the time being and possibly be re-assessed by a subsequent ED provider.  The patient will be brought back to the treatment area in as timely manner as possible.      Courtney Olivia, APRN  02/12/25 1640

## 2025-02-13 NOTE — DISCHARGE INSTRUCTIONS
You still have this pneumonia in the right lower lobe of your lung.  Your vital signs are good.  You are not requiring any oxygen.  I am comfortable with you going home from the hospital at this time.  I want you to take the rest of your Z-Vinicio and add cefdinir to take 2 times daily for 10 days.  Will send you in a steroid pack for you to take as well. I sent you in some more breathing treatments to the pharmacy. I want you to follow-up with your primary doctor within the next couple of days.  You can come back to the emergency department any new or worsening symptoms.

## 2025-02-13 NOTE — ED PROVIDER NOTES
Subjective   History of Present Illness  Patient is a 64-year-old female who presents to the emergency department with complaint of productive cough, shortness of breath, headache, pain in the middle of her back that radiates to her bilateral ribs/abdomen. She was here 2/9/25, was dx with pneumonia.  She states that she was prescribed azithromycin and has been taking it as prescribed.  States that her symptoms have worsened.  Feels like her pneumonia is in both lungs.  She presents back for further evaluation. states does not know why but they did not do blood work and further workup the other night when she was seen here.  She does have a history of COPD and tobacco use for many years she states.  She states she has been wheezy, does have breathing treatments at home.   Denies nausea, vomiting, diarrhea, fever, chills, and any other symptoms. Patient is not ill-appearing, nontoxic-appearing, and is in no acute distress.   Patient has a history of back pain, COPD, history of colon polyps, hyperglycemia, hyperlipidemia, and obstructive sleep apnea.            Review of Systems   Constitutional:  Positive for chills and fatigue.   HENT:  Positive for congestion.    Eyes: Negative.    Respiratory:  Positive for cough, chest tightness and shortness of breath.    Cardiovascular: Negative.    Gastrointestinal: Negative.    Endocrine: Negative.    Genitourinary: Negative.    Musculoskeletal:  Positive for back pain.   Skin: Negative.    Allergic/Immunologic: Negative.    Neurological:  Positive for headaches.   Hematological: Negative.    Psychiatric/Behavioral: Negative.     All other systems reviewed and are negative.      Past Medical History:   Diagnosis Date    Back pain     COPD, mild     History of colon polyps     Hyperglycemia     Hyperlipidemia     PREM (obstructive sleep apnea)        No Known Allergies    Past Surgical History:   Procedure Laterality Date    BREAST BIOPSY Right     CARDIAC DEFIBRILLATOR  PLACEMENT      COLONOSCOPY  11/19/2012    Five 6mm-1.2cm tubular adenomatous polyps in transverse colon; Four less than 5mm hyperplastic polyps in the rectum; Repeat 4 years    COLONOSCOPY N/A 11/6/2019    Procedure: COLONOSCOPY WITH ANESTHESIA;  Surgeon: Lauren No MD;  Location: [facility] ENDOSCOPY;  Service: Gastroenterology    PATENT FORAMEN OVALE CLOSURE      SUBTOTAL HYSTERECTOMY         Family History   Problem Relation Age of Onset    Colon cancer Neg Hx     Colon polyps Neg Hx     Esophageal cancer Neg Hx     Liver cancer Neg Hx     Liver disease Neg Hx     Rectal cancer Neg Hx     Stomach cancer Neg Hx        Social History     Socioeconomic History    Marital status: Single   Tobacco Use    Smoking status: Every Day     Current packs/day: 1.00     Types: Cigarettes    Smokeless tobacco: Never   Vaping Use    Vaping status: Never Used   Substance and Sexual Activity    Alcohol use: Yes     Comment: Occasionally    Drug use: No    Sexual activity: Defer           Objective   Physical Exam  Vitals and nursing note reviewed.   Constitutional:       General: She is not in acute distress.     Appearance: Normal appearance. She is normal weight. She is not ill-appearing, toxic-appearing or diaphoretic.   HENT:      Head: Normocephalic and atraumatic.      Right Ear: External ear normal.      Left Ear: External ear normal.      Nose: Nose normal. Congestion present. No rhinorrhea.      Mouth/Throat:      Mouth: Mucous membranes are moist.      Pharynx: Oropharynx is clear. No oropharyngeal exudate or posterior oropharyngeal erythema.   Eyes:      Extraocular Movements: Extraocular movements intact.      Conjunctiva/sclera: Conjunctivae normal.   Cardiovascular:      Rate and Rhythm: Normal rate and regular rhythm.      Pulses: Normal pulses.      Heart sounds: Normal heart sounds. No murmur heard.     No gallop.   Pulmonary:      Effort: Pulmonary effort is normal. No respiratory distress.      Breath sounds:  No stridor. Examination of the right-upper field reveals wheezing. Examination of the left-upper field reveals wheezing. Decreased breath sounds and wheezing present. No rhonchi.      Comments: Wheezing noted on expiration.  Decreased breath sounds in the lower bilateral lobes. no respiratory distress.  Not tachypneic.  Abdominal:      General: Abdomen is flat. Bowel sounds are normal. There is no distension.      Palpations: Abdomen is soft.      Tenderness: There is no abdominal tenderness. There is no guarding or rebound.      Hernia: No hernia is present.   Musculoskeletal:         General: No swelling, tenderness, deformity or signs of injury. Normal range of motion.      Cervical back: Normal range of motion and neck supple. No rigidity or tenderness.      Right lower leg: No edema.      Left lower leg: No edema.   Lymphadenopathy:      Cervical: No cervical adenopathy.   Skin:     General: Skin is warm and dry.      Capillary Refill: Capillary refill takes less than 2 seconds.      Coloration: Skin is not jaundiced.      Findings: No bruising, erythema or rash.   Neurological:      General: No focal deficit present.      Mental Status: She is alert and oriented to person, place, and time. Mental status is at baseline.      Cranial Nerves: No cranial nerve deficit.      Sensory: No sensory deficit.      Motor: No weakness.      Coordination: Coordination normal.      Gait: Gait normal.      Deep Tendon Reflexes: Reflexes normal.   Psychiatric:         Mood and Affect: Mood normal.         Behavior: Behavior normal.         Thought Content: Thought content normal.         Judgment: Judgment normal.         Procedures           ED Course  ED Course as of 02/13/25 1556   Wed Feb 12, 2025 1914 CURB-65 Score for Pneumonia Severity - MDCalc  Calculated on Feb 12 2025 8:14 PM  0 points -> Low risk group: 0.6% 30-day mortality. Consider outpatient treatment. [BS]   1915 Requesting something for headache, will give 1  g Tylenol. [BS]   1958 Chest XR:  IMPRESSION:  Right lung infiltrates are worrisome for pneumonia.      [BS]   2001 Awaiting repeat lactic acid [BS]   2018 Spoke with Dr. Camargo, he is comfortable with the patient going home due to her room air sats being 98%.  Patient will be given cefdinir to take alongside her Z-Vinicio.  Instructed to give her steroid pack as well.  Did refill her breathing treatments today.  Already has a nebulizer machine at home.  Instructed her to follow-up with her primary care doctor within next couple days. [BS]      ED Course User Index  [BS] Courtney Olivia, APRN                                                       Medical Decision Making  Patient is a 64-year-old female who presents to the emergency department with complaint of productive cough, shortness of breath, headache, pain in the middle of her back that radiates to her bilateral ribs/abdomen. She was here 2/9/25, was dx with pneumonia.  She states that she was prescribed azithromycin and has been taking it as prescribed.  States that her symptoms have worsened.  Feels like her pneumonia is in both lungs.  She presents back for further evaluation. states does not know why but they did not do blood work and further workup the other night when she was seen here.  She does have a history of COPD and tobacco use for many years she states.  She states she has been wheezy, does have breathing treatments at home.   Denies nausea, vomiting, diarrhea, fever, chills, and any other symptoms. Patient is not ill-appearing, nontoxic-appearing, and is in no acute distress.   Patient has a history of back pain, COPD, history of colon polyps, hyperglycemia, hyperlipidemia, and obstructive sleep apnea.    Differential diagnoses include but are not limited to pulmonary embolism, pneumonia, viral illness, COPD exacerbation, sepsis, hypoxia, hypercapnia, other etiologies.    Labs Reviewed  COMPREHENSIVE METABOLIC PANEL - Abnormal; Notable for the  following components:     Glucose                       166 (*)                BUN                           7 (*)                  Sodium                        134 (*)                Alkaline Phosphatase          168 (*)             All other components within normal limits         Narrative: GFR Categories in Chronic Kidney Disease (CKD)                                      GFR Category          GFR (mL/min/1.73)    Interpretation                  G1                     90 or greater         Normal or high (1)                  G2                      60-89                Mild decrease (1)                  G3a                   45-59                Mild to moderate decrease                  G3b                   30-44                Moderate to severe decrease                  G4                    15-29                Severe decrease                  G5                    14 or less           Kidney failure                                          (1)In the absence of evidence of kidney disease, neither GFR category G1 or G2 fulfill the criteria for CKD.                                    eGFR calculation 2021 CKD-EPI creatinine equation, which does not include race as a factor  D-DIMER, QUANTITATIVE - Abnormal; Notable for the following components:     D-Dimer, Quantitative         0.64 (*)            All other components within normal limits         Narrative: According to the assay 's published package insert, a normal (<0.50 MCGFEU/mL) D-dimer result in conjunction with a non-high clinical probability assessment, excludes deep vein thrombosis (DVT) and pulmonary embolism (PE) with high sensitivity.                                    D-dimer values increase with age and this can make VTE exclusion of an older population difficult. To address this, the [employer], based on best available evidence and recent guidelines, recommends that clinicians use age-adjusted D-dimer  "thresholds in patients greater than 50 years of age with: a) a low probability of PE who do not meet all Pulmonary Embolism Rule Out Criteria, or b) in those with intermediate probability of PE.                   The formula for an age-adjusted D-dimer cut-off is \"age/100\".                  For example, a 60 year old patient would have an age-adjusted cut-off of 0.60 MCGFEU/mL and an 80 year old 0.80 MCGFEU/mL.  LACTIC ACID, PLASMA - Abnormal; Notable for the following components:     Lactate                       3.5 (*)             All other components within normal limits  CBC WITH AUTO DIFFERENTIAL - Abnormal; Notable for the following components:     WBC                           14.16 (*)               RDW                           11.8 (*)               Neutrophil %                  78.9 (*)               Lymphocyte %                  13.8 (*)               Neutrophils, Absolute         11.18 (*)            All other components within normal limits  RESPIRATORY PANEL PCR W/ COVID-19 (SARS-COV-2), NP SWAB IN UTM/VTP, 2 HR TAT - Normal         Narrative: In the setting of a positive respiratory panel with a viral infection PLUS a negative procalcitonin without other underlying concern for bacterial infection, consider observing off antibiotics or discontinuation of antibiotics and continue supportive care. If the respiratory panel is positive for atypical bacterial infection (Bordetella pertussis, Chlamydophila pneumoniae, or Mycoplasma pneumoniae), consider antibiotic de-escalation to target atypical bacterial infection.  BLOOD CULTURE  BLOOD CULTURE  RAINBOW DRAW         Narrative: The following orders were created for panel order [place] Draw.                  Procedure                               Abnormality         Status                                     ---------                               -----------         ------                                     Green Top (Gel)[[id number]]                     "              Final result                               Lavender Top[[id number]]                                     Final result                               Red Top[[id number]]                                          Final result                               Light Blue Top[[id number]]                                   Final result                                                 Please view results for these tests on the individual orders.  CBC AND DIFFERENTIAL         Narrative: The following orders were created for panel order CBC & Differential.                  Procedure                               Abnormality         Status                                     ---------                               -----------         ------                                     CBC Auto Differential[[id number]]        Abnormal            Final result                                                 Please view results for these tests on the individual orders.  GREEN TOP  LAVENDER TOP  RED TOP  LIGHT BLUE TOP     CT Angiogram Chest   Final Result    1. No CT evidence of pulmonary embolus.    2. CT confirms parenchymal infiltrates in the right upper lobe and right    lower lobe consistent with pneumonia as seen on x-ray.    3. Right hilar and mediastinal lymphadenopathy. These lymph nodes are    likely reactive and related to the infiltrates. Neoplastic lymph nodes    are felt to be less likely. Consider a follow-up study after complete    treatment.              The full report of this exam was immediately signed and available to the    emergency room. The patient is currently in the emergency room.         This report was signed and finalized on 2/12/2025 8:04 PM by Dr. Kentrell Doherty MD.          XR Chest 1 View   Final Result    Right lung infiltrates are worrisome for pneumonia.                   This report was signed and finalized on 2/12/2025 6:23 PM by Dr. Kentrell Doherty MD.       Patient was evaluated emergency  department today for complaint of productive cough, shortness of breath, headache, pain in the middle of her back that radiates to her bilateral ribs/abdomen.  States she was diagnosed with pneumonia on 2/9/2025.  She states that she was sent home with a Z-Vinicio and has been taking it as prescribed.  She states that her symptoms have worsened.  She states that she now feels like she has bilateral pneumonia.  She states that she does not know why they did not do any further workup in the emergency department the other day when she was here.  She does have a history of COPD and tobacco abuse for many years she states she has been wheezing and has breathing treatments at home that she has been using.  She not have any nausea, vomiting, diarrhea, fever, chills or any other symptoms.  She was in no acute distress.  She was given Solu-Medrol, breathing treatment, 1 g of Tylenol for a headache.  Respiratory panel was negative for any respiratory illnesses.  Chest x-ray did show right left infiltrates worrisome for pneumonia.  Initial lactic acid was 3.5, she was given 500 mL of normal saline prior to discharge.  Patient had a glucose of 166 and a sodium of 134, she was given a steroid.  She denies any history of diabetes mellitus.  She states that her breathing treatment did improve her symptoms.  She is nonhypoxic on room air during her stay in the emergency department today.  Curb score of 0 points.  Spoke with Dr. Camargo who states that the patient can go home continuing her Z-Vinicio and adding cefdinir to take for 10 days twice daily.  Patient was also discharged with more breathing treatments, she already has a nebulizer machine at home.  She was also sent home with a Medrol pack.  D-dimer was elevated at 0.64, CTA result was negative for any pulmonary embolism, does confirm infiltrates in the right upper lobe and right lower lobe consistent with pneumonia seen on x-ray.  Right hilar and mediastinal lymphadenopathy.   These could be related to the infiltrates, neoplastic lymph nodes are less likely.  May need a repeat imaging after treatment is completed.  Spoke with patient regarding this.  She will speak with her primary care doctor.  Instructed follow-up with her primary care doctor within the next couple of days.  Instructed come back to the emergency department with any new or worsening symptoms.  Instructed to complete entire course of antibiotics and steroids.  Will be discharged home in stable condition.    Problems Addressed:  Chronic obstructive pulmonary disease, unspecified COPD type: complicated acute illness or injury  Pneumonia of right lower lobe due to infectious organism: complicated acute illness or injury    Amount and/or Complexity of Data Reviewed  Labs: ordered.  Radiology: ordered.    Risk  OTC drugs.  Prescription drug management.        Final diagnoses:   Pneumonia of right lower lobe due to infectious organism   Chronic obstructive pulmonary disease, unspecified COPD type       ED Disposition  ED Disposition       ED Disposition   Discharge    Condition   Stable    Comment   --               Jannet Osman, DO  [address] [facility] [facility]  [address]  [place] [address]  [phone]    Schedule an appointment as soon as possible for a visit in 2 days  follow up    [facility] EMERGENCY DEPARTMENT  [address] [place] [id number] [phone]    If symptoms worsen, As needed         Medication List        New Prescriptions      cefdinir 300 MG capsule  Commonly known as: OMNICEF  Take 1 capsule by mouth 2 (Two) Times a Day for 10 days.               Where to Get Your Medications        These medications were sent to [id number] - [place], [place] - [address] AT [facility] [address] - [phone]  - [phone] FX  [address], [facility] [address]      Phone: [phone]   albuterol 1.25 MG/3ML nebulizer solution  cefdinir 300 MG  capsule  methylPREDNISolone 4 MG dose pack            Courtney Olivia, APRN  02/13/25 1555

## 2025-02-14 ENCOUNTER — TELEPHONE (OUTPATIENT)
Dept: GASTROENTEROLOGY | Facility: CLINIC | Age: 65
End: 2025-02-14

## 2025-02-17 LAB
BACTERIA SPEC AEROBE CULT: NORMAL
BACTERIA SPEC AEROBE CULT: NORMAL

## (undated) DEVICE — MASK,OXYGEN,MED CONC,ADLT,7' TUB, UC: Brand: PENDING

## (undated) DEVICE — SENSR O2 OXIMAX FNGR A/ 18IN NONSTR

## (undated) DEVICE — THE CHANNEL CLEANING BRUSH IS A NYLON FLEXI BRUSH ATTACHED TO A FLEXIBLE PLASTIC SHEATH DESIGNED TO SAFELY REMOVE DEBRIS FROM FLEXIBLE ENDOSCOPES.

## (undated) DEVICE — CUFF,BP,DISP,1 TUBE,ADULT,HP: Brand: MEDLINE

## (undated) DEVICE — Device: Brand: DEFENDO AIR/WATER/SUCTION AND BIOPSY VALVE

## (undated) DEVICE — YANKAUER,BULB TIP WITH VENT: Brand: ARGYLE

## (undated) DEVICE — ENDOGATOR AUXILIARY WATER JET CONNECTOR: Brand: ENDOGATOR

## (undated) DEVICE — TBG SMPL FLTR LINE NASL 02/C02 A/ BX/100